# Patient Record
Sex: FEMALE | Race: WHITE | NOT HISPANIC OR LATINO | Employment: OTHER | URBAN - METROPOLITAN AREA
[De-identification: names, ages, dates, MRNs, and addresses within clinical notes are randomized per-mention and may not be internally consistent; named-entity substitution may affect disease eponyms.]

---

## 2017-01-17 ENCOUNTER — HOSPITAL ENCOUNTER (OUTPATIENT)
Dept: CT IMAGING | Facility: HOSPITAL | Age: 72
Discharge: HOME | End: 2017-01-17
Attending: INTERNAL MEDICINE

## 2017-01-17 ENCOUNTER — RECORDS - HEALTHEAST (OUTPATIENT)
Dept: PULMONOLOGY | Facility: OTHER | Age: 72
End: 2017-01-17

## 2017-01-17 ENCOUNTER — OFFICE VISIT - HEALTHEAST (OUTPATIENT)
Dept: PULMONOLOGY | Facility: OTHER | Age: 72
End: 2017-01-17

## 2017-01-17 ENCOUNTER — RECORDS - HEALTHEAST (OUTPATIENT)
Dept: ADMINISTRATIVE | Facility: OTHER | Age: 72
End: 2017-01-17

## 2017-01-17 DIAGNOSIS — J84.116 CRYPTOGENIC ORGANIZING PNEUMONIA (H): ICD-10-CM

## 2017-01-17 DIAGNOSIS — J30.0 VASOMOTOR RHINITIS: ICD-10-CM

## 2017-01-18 ENCOUNTER — OFFICE VISIT - HEALTHEAST (OUTPATIENT)
Dept: INTERNAL MEDICINE | Facility: CLINIC | Age: 72
End: 2017-01-18

## 2017-01-18 DIAGNOSIS — I10 HTN (HYPERTENSION): ICD-10-CM

## 2017-02-21 ENCOUNTER — COMMUNICATION - HEALTHEAST (OUTPATIENT)
Dept: INTERNAL MEDICINE | Facility: CLINIC | Age: 72
End: 2017-02-21

## 2017-02-28 ENCOUNTER — OFFICE VISIT - HEALTHEAST (OUTPATIENT)
Dept: INTERNAL MEDICINE | Facility: CLINIC | Age: 72
End: 2017-02-28

## 2017-02-28 DIAGNOSIS — E78.5 HYPERLIPIDEMIA: ICD-10-CM

## 2017-02-28 DIAGNOSIS — E83.52 HYPERCALCEMIA: ICD-10-CM

## 2017-02-28 DIAGNOSIS — I10 HYPERTENSION: ICD-10-CM

## 2017-02-28 LAB
CHOLEST SERPL-MCNC: 178 MG/DL
FASTING STATUS PATIENT QL REPORTED: YES
HDLC SERPL-MCNC: 44 MG/DL
LDLC SERPL CALC-MCNC: 80 MG/DL
TRIGL SERPL-MCNC: 272 MG/DL

## 2017-03-01 ENCOUNTER — COMMUNICATION - HEALTHEAST (OUTPATIENT)
Dept: INTERNAL MEDICINE | Facility: CLINIC | Age: 72
End: 2017-03-01

## 2017-03-02 ENCOUNTER — AMBULATORY - HEALTHEAST (OUTPATIENT)
Dept: INTERNAL MEDICINE | Facility: CLINIC | Age: 72
End: 2017-03-02

## 2017-03-02 DIAGNOSIS — E83.52 HYPERCALCEMIA: ICD-10-CM

## 2017-03-06 ENCOUNTER — COMMUNICATION - HEALTHEAST (OUTPATIENT)
Dept: INTERNAL MEDICINE | Facility: CLINIC | Age: 72
End: 2017-03-06

## 2017-03-06 ENCOUNTER — AMBULATORY - HEALTHEAST (OUTPATIENT)
Dept: LAB | Facility: CLINIC | Age: 72
End: 2017-03-06

## 2017-03-06 DIAGNOSIS — E83.52 HYPERCALCEMIA: ICD-10-CM

## 2017-03-10 ENCOUNTER — COMMUNICATION - HEALTHEAST (OUTPATIENT)
Dept: INTERNAL MEDICINE | Facility: CLINIC | Age: 72
End: 2017-03-10

## 2017-03-10 DIAGNOSIS — K21.9 GERD (GASTROESOPHAGEAL REFLUX DISEASE): ICD-10-CM

## 2017-04-11 ENCOUNTER — OFFICE VISIT - HEALTHEAST (OUTPATIENT)
Dept: PULMONOLOGY | Facility: OTHER | Age: 72
End: 2017-04-11

## 2017-04-11 DIAGNOSIS — R09.82 POST-NASAL DRIP: ICD-10-CM

## 2017-05-18 ENCOUNTER — COMMUNICATION - HEALTHEAST (OUTPATIENT)
Dept: INTERNAL MEDICINE | Facility: CLINIC | Age: 72
End: 2017-05-18

## 2017-05-18 ENCOUNTER — OFFICE VISIT - HEALTHEAST (OUTPATIENT)
Dept: OTOLARYNGOLOGY | Facility: CLINIC | Age: 72
End: 2017-05-18

## 2017-05-18 DIAGNOSIS — J37.0 CHRONIC LARYNGITIS: ICD-10-CM

## 2017-05-18 DIAGNOSIS — F41.1 ANXIETY STATE: ICD-10-CM

## 2017-05-18 DIAGNOSIS — J30.9 ALLERGIC RHINITIS: ICD-10-CM

## 2017-05-18 DIAGNOSIS — K21.9 LARYNGOPHARYNGEAL REFLUX (LPR): ICD-10-CM

## 2017-05-18 DIAGNOSIS — E78.5 HYPERLIPIDEMIA: ICD-10-CM

## 2017-05-18 ASSESSMENT — MIFFLIN-ST. JEOR: SCORE: 1085.56

## 2017-06-12 ENCOUNTER — COMMUNICATION - HEALTHEAST (OUTPATIENT)
Dept: INTERNAL MEDICINE | Facility: CLINIC | Age: 72
End: 2017-06-12

## 2017-06-12 DIAGNOSIS — E03.9 HYPOTHYROID: ICD-10-CM

## 2017-06-15 ENCOUNTER — COMMUNICATION - HEALTHEAST (OUTPATIENT)
Dept: INTERNAL MEDICINE | Facility: CLINIC | Age: 72
End: 2017-06-15

## 2017-06-15 DIAGNOSIS — K21.9 GERD (GASTROESOPHAGEAL REFLUX DISEASE): ICD-10-CM

## 2017-07-13 ENCOUNTER — OFFICE VISIT - HEALTHEAST (OUTPATIENT)
Dept: INTERNAL MEDICINE | Facility: CLINIC | Age: 72
End: 2017-07-13

## 2017-07-13 DIAGNOSIS — E03.9 HYPOTHYROIDISM: ICD-10-CM

## 2017-07-13 DIAGNOSIS — M81.0 OSTEOPOROSIS: ICD-10-CM

## 2017-07-17 LAB
GLIADIN IGA SER-ACNC: 0.3 U/ML
GLIADIN IGG SER-ACNC: <0.4 U/ML
IGA SERPL-MCNC: 179 MG/DL (ref 65–400)
TTG IGA SER-ACNC: 0.3 U/ML
TTG IGG SER-ACNC: <0.6 U/ML

## 2017-07-19 ENCOUNTER — COMMUNICATION - HEALTHEAST (OUTPATIENT)
Dept: INTERNAL MEDICINE | Facility: CLINIC | Age: 72
End: 2017-07-19

## 2017-07-20 ENCOUNTER — COMMUNICATION - HEALTHEAST (OUTPATIENT)
Dept: INTERNAL MEDICINE | Facility: CLINIC | Age: 72
End: 2017-07-20

## 2017-07-20 DIAGNOSIS — K21.9 GERD (GASTROESOPHAGEAL REFLUX DISEASE): ICD-10-CM

## 2017-09-18 ENCOUNTER — COMMUNICATION - HEALTHEAST (OUTPATIENT)
Dept: INTERNAL MEDICINE | Facility: CLINIC | Age: 72
End: 2017-09-18

## 2017-09-18 DIAGNOSIS — E03.9 HYPOTHYROID: ICD-10-CM

## 2017-10-27 ENCOUNTER — AMBULATORY - HEALTHEAST (OUTPATIENT)
Dept: PULMONOLOGY | Facility: OTHER | Age: 72
End: 2017-10-27

## 2017-10-27 DIAGNOSIS — R06.09 DYSPNEA ON EXERTION: ICD-10-CM

## 2017-10-31 ENCOUNTER — HOSPITAL ENCOUNTER (OUTPATIENT)
Dept: RADIOLOGY | Facility: CLINIC | Age: 72
Discharge: HOME | End: 2017-10-31
Attending: INTERNAL MEDICINE

## 2017-10-31 DIAGNOSIS — R06.09 OTHER FORMS OF DYSPNEA: ICD-10-CM

## 2017-10-31 DIAGNOSIS — R06.09 DYSPNEA ON EXERTION: ICD-10-CM

## 2017-12-04 ENCOUNTER — OFFICE VISIT - HEALTHEAST (OUTPATIENT)
Dept: PULMONOLOGY | Facility: OTHER | Age: 72
End: 2017-12-04

## 2017-12-04 DIAGNOSIS — R05.3 CHRONIC COUGH: ICD-10-CM

## 2017-12-28 ENCOUNTER — HOSPITAL ENCOUNTER (OUTPATIENT)
Dept: MAMMOGRAPHY | Facility: CLINIC | Age: 72
Discharge: HOME | End: 2017-12-28
Attending: INTERNAL MEDICINE

## 2017-12-28 DIAGNOSIS — Z12.31 VISIT FOR SCREENING MAMMOGRAM: ICD-10-CM

## 2018-03-08 ENCOUNTER — OFFICE VISIT - HEALTHEAST (OUTPATIENT)
Dept: INTERNAL MEDICINE | Facility: CLINIC | Age: 73
End: 2018-03-08

## 2018-03-08 ENCOUNTER — COMMUNICATION - HEALTHEAST (OUTPATIENT)
Dept: INTERNAL MEDICINE | Facility: CLINIC | Age: 73
End: 2018-03-08

## 2018-03-08 ENCOUNTER — RECORDS - HEALTHEAST (OUTPATIENT)
Dept: GENERAL RADIOLOGY | Facility: CLINIC | Age: 73
End: 2018-03-08

## 2018-03-08 DIAGNOSIS — R10.32 LEFT LOWER QUADRANT PAIN: ICD-10-CM

## 2018-03-08 DIAGNOSIS — E03.9 HYPOTHYROIDISM: ICD-10-CM

## 2018-03-08 DIAGNOSIS — E78.2 MIXED HYPERLIPIDEMIA: ICD-10-CM

## 2018-03-08 DIAGNOSIS — I10 ESSENTIAL HYPERTENSION: ICD-10-CM

## 2018-03-08 DIAGNOSIS — R10.32 ABDOMINAL PAIN, CHRONIC, LEFT LOWER QUADRANT: ICD-10-CM

## 2018-03-08 DIAGNOSIS — G89.29 OTHER CHRONIC PAIN: ICD-10-CM

## 2018-03-08 DIAGNOSIS — E55.9 VITAMIN D DEFICIENCY: ICD-10-CM

## 2018-03-08 DIAGNOSIS — G89.29 ABDOMINAL PAIN, CHRONIC, LEFT LOWER QUADRANT: ICD-10-CM

## 2018-03-08 LAB
ALBUMIN SERPL-MCNC: 3.9 G/DL (ref 3.5–5)
ALP SERPL-CCNC: 83 U/L (ref 45–120)
ALT SERPL W P-5'-P-CCNC: 27 U/L (ref 0–45)
ANION GAP SERPL CALCULATED.3IONS-SCNC: 10 MMOL/L (ref 5–18)
AST SERPL W P-5'-P-CCNC: 24 U/L (ref 0–40)
BILIRUB SERPL-MCNC: 0.4 MG/DL (ref 0–1)
BUN SERPL-MCNC: 10 MG/DL (ref 8–28)
CALCIUM SERPL-MCNC: 10.1 MG/DL (ref 8.5–10.5)
CHLORIDE BLD-SCNC: 106 MMOL/L (ref 98–107)
CHOLEST SERPL-MCNC: 270 MG/DL
CO2 SERPL-SCNC: 26 MMOL/L (ref 22–31)
CREAT SERPL-MCNC: 0.79 MG/DL (ref 0.6–1.1)
FASTING STATUS PATIENT QL REPORTED: NO
GFR SERPL CREATININE-BSD FRML MDRD: >60 ML/MIN/1.73M2
GLUCOSE BLD-MCNC: 90 MG/DL (ref 70–125)
HDLC SERPL-MCNC: 45 MG/DL
LDLC SERPL CALC-MCNC: 146 MG/DL
POTASSIUM BLD-SCNC: 5 MMOL/L (ref 3.5–5)
PROT SERPL-MCNC: 7.7 G/DL (ref 6–8)
SODIUM SERPL-SCNC: 142 MMOL/L (ref 136–145)
TRIGL SERPL-MCNC: 393 MG/DL
TSH SERPL DL<=0.005 MIU/L-ACNC: 1.84 UIU/ML (ref 0.3–5)

## 2018-03-09 LAB — 25(OH)D3 SERPL-MCNC: 60.7 NG/ML (ref 30–80)

## 2018-03-15 ENCOUNTER — HOSPITAL ENCOUNTER (OUTPATIENT)
Dept: ULTRASOUND IMAGING | Facility: CLINIC | Age: 73
Discharge: HOME | End: 2018-03-15
Attending: INTERNAL MEDICINE

## 2018-03-15 ENCOUNTER — COMMUNICATION - HEALTHEAST (OUTPATIENT)
Dept: INTERNAL MEDICINE | Facility: CLINIC | Age: 73
End: 2018-03-15

## 2018-03-15 DIAGNOSIS — G89.29 ABDOMINAL PAIN, CHRONIC, LEFT LOWER QUADRANT: ICD-10-CM

## 2018-03-15 DIAGNOSIS — R10.32 ABDOMINAL PAIN, CHRONIC, LEFT LOWER QUADRANT: ICD-10-CM

## 2018-03-25 ENCOUNTER — COMMUNICATION - HEALTHEAST (OUTPATIENT)
Dept: INTERNAL MEDICINE | Facility: CLINIC | Age: 73
End: 2018-03-25

## 2018-04-11 ENCOUNTER — OFFICE VISIT - HEALTHEAST (OUTPATIENT)
Dept: INTERNAL MEDICINE | Facility: CLINIC | Age: 73
End: 2018-04-11

## 2018-04-11 DIAGNOSIS — J02.9 SORE THROAT: ICD-10-CM

## 2018-04-11 DIAGNOSIS — J30.0 VASOMOTOR RHINITIS: ICD-10-CM

## 2018-04-11 DIAGNOSIS — I10 ESSENTIAL HYPERTENSION: ICD-10-CM

## 2018-04-11 DIAGNOSIS — R10.32 LLQ ABDOMINAL PAIN: ICD-10-CM

## 2018-04-11 LAB
ANION GAP SERPL CALCULATED.3IONS-SCNC: 15 MMOL/L (ref 5–18)
BUN SERPL-MCNC: 15 MG/DL (ref 8–28)
CALCIUM SERPL-MCNC: 11.6 MG/DL (ref 8.5–10.5)
CHLORIDE BLD-SCNC: 100 MMOL/L (ref 98–107)
CO2 SERPL-SCNC: 29 MMOL/L (ref 22–31)
CREAT SERPL-MCNC: 0.73 MG/DL (ref 0.6–1.1)
GFR SERPL CREATININE-BSD FRML MDRD: >60 ML/MIN/1.73M2
GLUCOSE BLD-MCNC: 85 MG/DL (ref 70–125)
POTASSIUM BLD-SCNC: 3.6 MMOL/L (ref 3.5–5)
SODIUM SERPL-SCNC: 144 MMOL/L (ref 136–145)

## 2018-04-12 ENCOUNTER — COMMUNICATION - HEALTHEAST (OUTPATIENT)
Dept: INTERNAL MEDICINE | Facility: CLINIC | Age: 73
End: 2018-04-12

## 2018-04-25 ENCOUNTER — OFFICE VISIT - HEALTHEAST (OUTPATIENT)
Dept: INTERNAL MEDICINE | Facility: CLINIC | Age: 73
End: 2018-04-25

## 2018-04-25 DIAGNOSIS — I10 ESSENTIAL HYPERTENSION: ICD-10-CM

## 2018-04-25 LAB
ANION GAP SERPL CALCULATED.3IONS-SCNC: 10 MMOL/L (ref 5–18)
BUN SERPL-MCNC: 17 MG/DL (ref 8–28)
CALCIUM SERPL-MCNC: 10.5 MG/DL (ref 8.5–10.5)
CHLORIDE BLD-SCNC: 98 MMOL/L (ref 98–107)
CO2 SERPL-SCNC: 33 MMOL/L (ref 22–31)
CREAT SERPL-MCNC: 0.8 MG/DL (ref 0.6–1.1)
GFR SERPL CREATININE-BSD FRML MDRD: >60 ML/MIN/1.73M2
GLUCOSE BLD-MCNC: 88 MG/DL (ref 70–125)
POTASSIUM BLD-SCNC: 3.1 MMOL/L (ref 3.5–5)
SODIUM SERPL-SCNC: 141 MMOL/L (ref 136–145)

## 2018-05-30 ENCOUNTER — OFFICE VISIT - HEALTHEAST (OUTPATIENT)
Dept: INTERNAL MEDICINE | Facility: CLINIC | Age: 73
End: 2018-05-30

## 2018-05-30 DIAGNOSIS — E78.00 HYPERCHOLESTEREMIA: ICD-10-CM

## 2018-05-30 DIAGNOSIS — I10 HYPERTENSION: ICD-10-CM

## 2018-05-30 LAB
ANION GAP SERPL CALCULATED.3IONS-SCNC: 9 MMOL/L (ref 5–18)
BUN SERPL-MCNC: 17 MG/DL (ref 8–28)
CALCIUM SERPL-MCNC: 11 MG/DL (ref 8.5–10.5)
CHLORIDE BLD-SCNC: 100 MMOL/L (ref 98–107)
CHOLEST SERPL-MCNC: 168 MG/DL
CO2 SERPL-SCNC: 35 MMOL/L (ref 22–31)
CREAT SERPL-MCNC: 0.76 MG/DL (ref 0.6–1.1)
FASTING STATUS PATIENT QL REPORTED: YES
GFR SERPL CREATININE-BSD FRML MDRD: >60 ML/MIN/1.73M2
GLUCOSE BLD-MCNC: 92 MG/DL (ref 70–125)
HDLC SERPL-MCNC: 44 MG/DL
LDLC SERPL CALC-MCNC: 71 MG/DL
POTASSIUM BLD-SCNC: 3.3 MMOL/L (ref 3.5–5)
SODIUM SERPL-SCNC: 144 MMOL/L (ref 136–145)
TRIGL SERPL-MCNC: 265 MG/DL

## 2018-06-19 ENCOUNTER — COMMUNICATION - HEALTHEAST (OUTPATIENT)
Dept: INTERNAL MEDICINE | Facility: CLINIC | Age: 73
End: 2018-06-19

## 2018-06-19 DIAGNOSIS — E03.9 HYPOTHYROID: ICD-10-CM

## 2018-08-07 ENCOUNTER — COMMUNICATION - HEALTHEAST (OUTPATIENT)
Dept: INTERNAL MEDICINE | Facility: CLINIC | Age: 73
End: 2018-08-07

## 2018-08-07 DIAGNOSIS — E78.2 MIXED HYPERLIPIDEMIA: ICD-10-CM

## 2018-09-17 ENCOUNTER — COMMUNICATION - HEALTHEAST (OUTPATIENT)
Dept: INTERNAL MEDICINE | Facility: CLINIC | Age: 73
End: 2018-09-17

## 2018-09-17 DIAGNOSIS — I10 ESSENTIAL HYPERTENSION: ICD-10-CM

## 2018-10-02 ENCOUNTER — OFFICE VISIT - HEALTHEAST (OUTPATIENT)
Dept: INTERNAL MEDICINE | Facility: CLINIC | Age: 73
End: 2018-10-02

## 2018-10-02 DIAGNOSIS — R53.83 FATIGUE: ICD-10-CM

## 2018-10-02 DIAGNOSIS — I10 ESSENTIAL HYPERTENSION: ICD-10-CM

## 2018-10-02 LAB
ALBUMIN SERPL-MCNC: 4.1 G/DL (ref 3.5–5)
ALP SERPL-CCNC: 46 U/L (ref 45–120)
ALT SERPL W P-5'-P-CCNC: 22 U/L (ref 0–45)
ANION GAP SERPL CALCULATED.3IONS-SCNC: 13 MMOL/L (ref 5–18)
AST SERPL W P-5'-P-CCNC: 21 U/L (ref 0–40)
BILIRUB SERPL-MCNC: 0.7 MG/DL (ref 0–1)
BUN SERPL-MCNC: 19 MG/DL (ref 8–28)
CALCIUM SERPL-MCNC: 10.7 MG/DL (ref 8.5–10.5)
CHLORIDE BLD-SCNC: 101 MMOL/L (ref 98–107)
CO2 SERPL-SCNC: 30 MMOL/L (ref 22–31)
CREAT SERPL-MCNC: 0.77 MG/DL (ref 0.6–1.1)
ERYTHROCYTE [DISTWIDTH] IN BLOOD BY AUTOMATED COUNT: 12.6 % (ref 11–14.5)
GFR SERPL CREATININE-BSD FRML MDRD: >60 ML/MIN/1.73M2
GLUCOSE BLD-MCNC: 94 MG/DL (ref 70–125)
HCT VFR BLD AUTO: 45 % (ref 35–47)
HGB BLD-MCNC: 14.9 G/DL (ref 12–16)
MCH RBC QN AUTO: 29.8 PG (ref 27–34)
MCHC RBC AUTO-ENTMCNC: 33.1 G/DL (ref 32–36)
MCV RBC AUTO: 90 FL (ref 80–100)
PLATELET # BLD AUTO: 199 THOU/UL (ref 140–440)
PMV BLD AUTO: 8.2 FL (ref 7–10)
POTASSIUM BLD-SCNC: 3.3 MMOL/L (ref 3.5–5)
PROT SERPL-MCNC: 7.7 G/DL (ref 6–8)
RBC # BLD AUTO: 4.99 MILL/UL (ref 3.8–5.4)
SODIUM SERPL-SCNC: 144 MMOL/L (ref 136–145)
TSH SERPL DL<=0.005 MIU/L-ACNC: 1.24 UIU/ML (ref 0.3–5)
VIT B12 SERPL-MCNC: 455 PG/ML (ref 213–816)
WBC: 9 THOU/UL (ref 4–11)

## 2018-10-03 LAB — 25(OH)D3 SERPL-MCNC: 57.4 NG/ML (ref 30–80)

## 2018-10-24 ENCOUNTER — OFFICE VISIT - HEALTHEAST (OUTPATIENT)
Dept: INTERNAL MEDICINE | Facility: CLINIC | Age: 73
End: 2018-10-24

## 2018-10-24 DIAGNOSIS — I10 ESSENTIAL HYPERTENSION: ICD-10-CM

## 2018-10-24 LAB
ANION GAP SERPL CALCULATED.3IONS-SCNC: 9 MMOL/L (ref 5–18)
BUN SERPL-MCNC: 16 MG/DL (ref 8–28)
CALCIUM SERPL-MCNC: 10.5 MG/DL (ref 8.5–10.5)
CHLORIDE BLD-SCNC: 101 MMOL/L (ref 98–107)
CO2 SERPL-SCNC: 31 MMOL/L (ref 22–31)
CREAT SERPL-MCNC: 0.81 MG/DL (ref 0.6–1.1)
GFR SERPL CREATININE-BSD FRML MDRD: >60 ML/MIN/1.73M2
GLUCOSE BLD-MCNC: 87 MG/DL (ref 70–125)
POTASSIUM BLD-SCNC: 4 MMOL/L (ref 3.5–5)
SODIUM SERPL-SCNC: 141 MMOL/L (ref 136–145)

## 2018-10-25 ENCOUNTER — COMMUNICATION - HEALTHEAST (OUTPATIENT)
Dept: INTERNAL MEDICINE | Facility: CLINIC | Age: 73
End: 2018-10-25

## 2018-11-13 ENCOUNTER — OFFICE VISIT - HEALTHEAST (OUTPATIENT)
Dept: INTERNAL MEDICINE | Facility: CLINIC | Age: 73
End: 2018-11-13

## 2018-11-13 ENCOUNTER — AMBULATORY - HEALTHEAST (OUTPATIENT)
Dept: INTERNAL MEDICINE | Facility: CLINIC | Age: 73
End: 2018-11-13

## 2018-11-13 DIAGNOSIS — E78.2 MIXED HYPERLIPIDEMIA: ICD-10-CM

## 2018-11-13 DIAGNOSIS — Z23 NEED FOR IMMUNIZATION AGAINST INFLUENZA: ICD-10-CM

## 2018-11-13 DIAGNOSIS — E55.9 VITAMIN D DEFICIENCY: ICD-10-CM

## 2018-11-13 DIAGNOSIS — F41.1 ANXIETY STATE: ICD-10-CM

## 2018-11-13 DIAGNOSIS — I10 ESSENTIAL HYPERTENSION: ICD-10-CM

## 2018-11-13 DIAGNOSIS — E03.9 HYPOTHYROIDISM, UNSPECIFIED TYPE: ICD-10-CM

## 2018-11-13 DIAGNOSIS — Z00.00 ROUTINE GENERAL MEDICAL EXAMINATION AT A HEALTH CARE FACILITY: ICD-10-CM

## 2018-11-13 DIAGNOSIS — E83.52 HYPERCALCEMIA: ICD-10-CM

## 2018-11-13 DIAGNOSIS — M81.0 AGE RELATED OSTEOPOROSIS, UNSPECIFIED PATHOLOGICAL FRACTURE PRESENCE: ICD-10-CM

## 2018-11-13 DIAGNOSIS — K21.9 GASTROESOPHAGEAL REFLUX DISEASE WITHOUT ESOPHAGITIS: ICD-10-CM

## 2018-11-13 LAB
ALBUMIN SERPL-MCNC: 4.3 G/DL (ref 3.5–5)
ALP SERPL-CCNC: 58 U/L (ref 45–120)
ALT SERPL W P-5'-P-CCNC: 28 U/L (ref 0–45)
ANION GAP SERPL CALCULATED.3IONS-SCNC: 13 MMOL/L (ref 5–18)
AST SERPL W P-5'-P-CCNC: 28 U/L (ref 0–40)
BILIRUB SERPL-MCNC: 0.7 MG/DL (ref 0–1)
BUN SERPL-MCNC: 16 MG/DL (ref 8–28)
CALCIUM SERPL-MCNC: 11 MG/DL (ref 8.5–10.5)
CHLORIDE BLD-SCNC: 100 MMOL/L (ref 98–107)
CHOLEST SERPL-MCNC: 169 MG/DL
CO2 SERPL-SCNC: 29 MMOL/L (ref 22–31)
CREAT SERPL-MCNC: 0.85 MG/DL (ref 0.6–1.1)
ERYTHROCYTE [DISTWIDTH] IN BLOOD BY AUTOMATED COUNT: 12.2 % (ref 11–14.5)
FASTING STATUS PATIENT QL REPORTED: YES
GFR SERPL CREATININE-BSD FRML MDRD: >60 ML/MIN/1.73M2
GLUCOSE BLD-MCNC: 91 MG/DL (ref 70–125)
HCT VFR BLD AUTO: 46.6 % (ref 35–47)
HDLC SERPL-MCNC: 49 MG/DL
HGB BLD-MCNC: 15.1 G/DL (ref 12–16)
LDLC SERPL CALC-MCNC: 66 MG/DL
MCH RBC QN AUTO: 30 PG (ref 27–34)
MCHC RBC AUTO-ENTMCNC: 32.4 G/DL (ref 32–36)
MCV RBC AUTO: 93 FL (ref 80–100)
PLATELET # BLD AUTO: 220 THOU/UL (ref 140–440)
PMV BLD AUTO: 11.2 FL (ref 8.5–12.5)
POTASSIUM BLD-SCNC: 3.8 MMOL/L (ref 3.5–5)
PROT SERPL-MCNC: 8.1 G/DL (ref 6–8)
RBC # BLD AUTO: 5.04 MILL/UL (ref 3.8–5.4)
SODIUM SERPL-SCNC: 142 MMOL/L (ref 136–145)
TRIGL SERPL-MCNC: 268 MG/DL
WBC: 10.5 THOU/UL (ref 4–11)

## 2018-11-13 ASSESSMENT — MIFFLIN-ST. JEOR: SCORE: 1093.7

## 2018-11-14 ENCOUNTER — COMMUNICATION - HEALTHEAST (OUTPATIENT)
Dept: INTERNAL MEDICINE | Facility: CLINIC | Age: 73
End: 2018-11-14

## 2018-11-14 LAB — 25(OH)D3 SERPL-MCNC: 60.8 NG/ML (ref 30–80)

## 2018-11-30 ENCOUNTER — AMBULATORY - HEALTHEAST (OUTPATIENT)
Dept: LAB | Facility: CLINIC | Age: 73
End: 2018-11-30

## 2018-11-30 DIAGNOSIS — E83.52 HYPERCALCEMIA: ICD-10-CM

## 2018-11-30 LAB
ANION GAP SERPL CALCULATED.3IONS-SCNC: 12 MMOL/L (ref 5–18)
BUN SERPL-MCNC: 14 MG/DL (ref 8–28)
CALCIUM SERPL-MCNC: 10.2 MG/DL (ref 8.5–10.5)
CHLORIDE BLD-SCNC: 103 MMOL/L (ref 98–107)
CO2 SERPL-SCNC: 28 MMOL/L (ref 22–31)
CREAT SERPL-MCNC: 0.77 MG/DL (ref 0.6–1.1)
GFR SERPL CREATININE-BSD FRML MDRD: >60 ML/MIN/1.73M2
GLUCOSE BLD-MCNC: 92 MG/DL (ref 70–125)
POTASSIUM BLD-SCNC: 3.5 MMOL/L (ref 3.5–5)
PTH-INTACT SERPL-MCNC: 42 PG/ML (ref 10–86)
SODIUM SERPL-SCNC: 143 MMOL/L (ref 136–145)

## 2018-12-11 ENCOUNTER — COMMUNICATION - HEALTHEAST (OUTPATIENT)
Dept: INTERNAL MEDICINE | Facility: CLINIC | Age: 73
End: 2018-12-11

## 2018-12-18 ENCOUNTER — OFFICE VISIT - HEALTHEAST (OUTPATIENT)
Dept: PULMONOLOGY | Facility: OTHER | Age: 73
End: 2018-12-18

## 2018-12-18 DIAGNOSIS — J84.89 ORGANIZING PNEUMONIA (H): ICD-10-CM

## 2018-12-18 ASSESSMENT — MIFFLIN-ST. JEOR: SCORE: 1112.78

## 2019-01-02 ENCOUNTER — HOSPITAL ENCOUNTER (OUTPATIENT)
Dept: MAMMOGRAPHY | Facility: CLINIC | Age: 74
Discharge: HOME | End: 2019-01-02
Attending: INTERNAL MEDICINE

## 2019-01-02 DIAGNOSIS — Z12.31 VISIT FOR SCREENING MAMMOGRAM: ICD-10-CM

## 2019-03-06 ENCOUNTER — COMMUNICATION - HEALTHEAST (OUTPATIENT)
Dept: INTERNAL MEDICINE | Facility: CLINIC | Age: 74
End: 2019-03-06

## 2019-03-11 ENCOUNTER — OFFICE VISIT - HEALTHEAST (OUTPATIENT)
Dept: FAMILY MEDICINE | Facility: CLINIC | Age: 74
End: 2019-03-11

## 2019-03-11 DIAGNOSIS — M79.622 PAIN OF LEFT UPPER ARM: ICD-10-CM

## 2019-03-13 ENCOUNTER — COMMUNICATION - HEALTHEAST (OUTPATIENT)
Dept: INTERNAL MEDICINE | Facility: CLINIC | Age: 74
End: 2019-03-13

## 2019-03-13 DIAGNOSIS — E03.9 HYPOTHYROID: ICD-10-CM

## 2019-03-19 ENCOUNTER — OFFICE VISIT - HEALTHEAST (OUTPATIENT)
Dept: FAMILY MEDICINE | Facility: CLINIC | Age: 74
End: 2019-03-19

## 2019-03-19 DIAGNOSIS — E03.9 HYPOTHYROIDISM, UNSPECIFIED TYPE: ICD-10-CM

## 2019-03-19 DIAGNOSIS — I10 ESSENTIAL HYPERTENSION: ICD-10-CM

## 2019-03-19 DIAGNOSIS — Z76.89 ENCOUNTER TO ESTABLISH CARE: ICD-10-CM

## 2019-03-19 DIAGNOSIS — E78.2 MIXED HYPERLIPIDEMIA: ICD-10-CM

## 2019-03-19 DIAGNOSIS — M81.0 AGE RELATED OSTEOPOROSIS, UNSPECIFIED PATHOLOGICAL FRACTURE PRESENCE: ICD-10-CM

## 2019-04-27 ENCOUNTER — COMMUNICATION - HEALTHEAST (OUTPATIENT)
Dept: INTERNAL MEDICINE | Facility: CLINIC | Age: 74
End: 2019-04-27

## 2019-04-27 DIAGNOSIS — I10 ESSENTIAL HYPERTENSION: ICD-10-CM

## 2019-05-14 ENCOUNTER — COMMUNICATION - HEALTHEAST (OUTPATIENT)
Dept: INTERNAL MEDICINE | Facility: CLINIC | Age: 74
End: 2019-05-14

## 2019-05-14 DIAGNOSIS — E78.2 MIXED HYPERLIPIDEMIA: ICD-10-CM

## 2019-05-21 ENCOUNTER — OFFICE VISIT - HEALTHEAST (OUTPATIENT)
Dept: FAMILY MEDICINE | Facility: CLINIC | Age: 74
End: 2019-05-21

## 2019-05-21 DIAGNOSIS — R07.0 THROAT PAIN: ICD-10-CM

## 2019-05-21 DIAGNOSIS — B34.9 VIRAL SYNDROME: ICD-10-CM

## 2019-05-21 LAB — DEPRECATED S PYO AG THROAT QL EIA: NORMAL

## 2019-05-22 LAB — GROUP A STREP BY PCR: NORMAL

## 2019-10-12 ENCOUNTER — COMMUNICATION - HEALTHEAST (OUTPATIENT)
Dept: INTERNAL MEDICINE | Facility: CLINIC | Age: 74
End: 2019-10-12

## 2019-10-12 DIAGNOSIS — K21.9 GERD (GASTROESOPHAGEAL REFLUX DISEASE): ICD-10-CM

## 2019-10-25 ENCOUNTER — COMMUNICATION - HEALTHEAST (OUTPATIENT)
Dept: INTERNAL MEDICINE | Facility: CLINIC | Age: 74
End: 2019-10-25

## 2019-10-25 DIAGNOSIS — I10 ESSENTIAL HYPERTENSION: ICD-10-CM

## 2019-11-16 ENCOUNTER — COMMUNICATION - HEALTHEAST (OUTPATIENT)
Dept: INTERNAL MEDICINE | Facility: CLINIC | Age: 74
End: 2019-11-16

## 2019-11-16 DIAGNOSIS — R11.0 NAUSEA: ICD-10-CM

## 2019-12-30 ENCOUNTER — COMMUNICATION - HEALTHEAST (OUTPATIENT)
Dept: INTERNAL MEDICINE | Facility: CLINIC | Age: 74
End: 2019-12-30

## 2019-12-30 DIAGNOSIS — E03.9 HYPOTHYROID: ICD-10-CM

## 2020-01-20 ENCOUNTER — OFFICE VISIT - HEALTHEAST (OUTPATIENT)
Dept: FAMILY MEDICINE | Facility: CLINIC | Age: 75
End: 2020-01-20

## 2020-01-20 DIAGNOSIS — E03.9 HYPOTHYROIDISM, UNSPECIFIED TYPE: ICD-10-CM

## 2020-01-20 DIAGNOSIS — E78.2 MIXED HYPERLIPIDEMIA: ICD-10-CM

## 2020-01-20 DIAGNOSIS — E03.9 HYPOTHYROID: ICD-10-CM

## 2020-01-20 DIAGNOSIS — E83.52 SERUM CALCIUM ELEVATED: ICD-10-CM

## 2020-01-20 DIAGNOSIS — Z00.00 ROUTINE GENERAL MEDICAL EXAMINATION AT A HEALTH CARE FACILITY: ICD-10-CM

## 2020-01-20 DIAGNOSIS — I10 ESSENTIAL HYPERTENSION: ICD-10-CM

## 2020-01-20 LAB
ANION GAP SERPL CALCULATED.3IONS-SCNC: 11 MMOL/L (ref 5–18)
BUN SERPL-MCNC: 14 MG/DL (ref 8–28)
CALCIUM SERPL-MCNC: 10.8 MG/DL (ref 8.5–10.5)
CHLORIDE BLD-SCNC: 101 MMOL/L (ref 98–107)
CHOLEST SERPL-MCNC: 185 MG/DL
CO2 SERPL-SCNC: 30 MMOL/L (ref 22–31)
CREAT SERPL-MCNC: 0.81 MG/DL (ref 0.6–1.1)
FASTING STATUS PATIENT QL REPORTED: YES
GFR SERPL CREATININE-BSD FRML MDRD: >60 ML/MIN/1.73M2
GLUCOSE BLD-MCNC: 88 MG/DL (ref 70–125)
HDLC SERPL-MCNC: 47 MG/DL
LDLC SERPL CALC-MCNC: 86 MG/DL
POTASSIUM BLD-SCNC: 3.6 MMOL/L (ref 3.5–5)
SODIUM SERPL-SCNC: 142 MMOL/L (ref 136–145)
TRIGL SERPL-MCNC: 259 MG/DL
TSH SERPL DL<=0.005 MIU/L-ACNC: 1.34 UIU/ML (ref 0.3–5)

## 2020-01-20 ASSESSMENT — MIFFLIN-ST. JEOR: SCORE: 1106.88

## 2020-01-21 ENCOUNTER — COMMUNICATION - HEALTHEAST (OUTPATIENT)
Dept: FAMILY MEDICINE | Facility: CLINIC | Age: 75
End: 2020-01-21

## 2020-01-21 LAB
HCV AB SERPL QL IA: NEGATIVE
PTH-INTACT SERPL-MCNC: 26 PG/ML (ref 10–86)

## 2020-02-07 ENCOUNTER — COMMUNICATION - HEALTHEAST (OUTPATIENT)
Dept: TELEHEALTH | Facility: CLINIC | Age: 75
End: 2020-02-07

## 2020-02-07 ENCOUNTER — HOSPITAL ENCOUNTER (OUTPATIENT)
Dept: MAMMOGRAPHY | Facility: CLINIC | Age: 75
Discharge: HOME | End: 2020-02-07

## 2020-02-07 DIAGNOSIS — Z12.31 VISIT FOR SCREENING MAMMOGRAM: ICD-10-CM

## 2020-03-13 ENCOUNTER — COMMUNICATION - HEALTHEAST (OUTPATIENT)
Dept: SCHEDULING | Facility: CLINIC | Age: 75
End: 2020-03-13

## 2020-03-13 DIAGNOSIS — K21.9 GERD (GASTROESOPHAGEAL REFLUX DISEASE): ICD-10-CM

## 2020-04-21 ENCOUNTER — COMMUNICATION - HEALTHEAST (OUTPATIENT)
Dept: FAMILY MEDICINE | Facility: CLINIC | Age: 75
End: 2020-04-21

## 2020-04-21 ENCOUNTER — COMMUNICATION - HEALTHEAST (OUTPATIENT)
Dept: INTERNAL MEDICINE | Facility: CLINIC | Age: 75
End: 2020-04-21

## 2020-04-21 DIAGNOSIS — K21.9 GERD (GASTROESOPHAGEAL REFLUX DISEASE): ICD-10-CM

## 2020-08-20 ENCOUNTER — RECORDS - HEALTHEAST (OUTPATIENT)
Dept: GENERAL RADIOLOGY | Facility: CLINIC | Age: 75
End: 2020-08-20

## 2020-08-20 ENCOUNTER — OFFICE VISIT - HEALTHEAST (OUTPATIENT)
Dept: INTERNAL MEDICINE | Facility: CLINIC | Age: 75
End: 2020-08-20

## 2020-08-20 DIAGNOSIS — R11.0 NAUSEA: ICD-10-CM

## 2020-08-20 DIAGNOSIS — M81.0 AGE RELATED OSTEOPOROSIS, UNSPECIFIED PATHOLOGICAL FRACTURE PRESENCE: ICD-10-CM

## 2020-08-20 DIAGNOSIS — I10 ESSENTIAL HYPERTENSION: ICD-10-CM

## 2020-08-20 DIAGNOSIS — E78.2 MIXED HYPERLIPIDEMIA: ICD-10-CM

## 2020-08-20 DIAGNOSIS — Z76.89 ENCOUNTER TO ESTABLISH CARE: ICD-10-CM

## 2020-08-20 DIAGNOSIS — Z92.29 PERSONAL HISTORY OF OTHER DRUG THERAPY: ICD-10-CM

## 2020-08-20 DIAGNOSIS — E03.9 HYPOTHYROIDISM, UNSPECIFIED TYPE: ICD-10-CM

## 2020-08-20 DIAGNOSIS — S69.92XA INJURY OF LEFT WRIST, INITIAL ENCOUNTER: ICD-10-CM

## 2020-08-20 DIAGNOSIS — S69.92XA UNSPECIFIED INJURY OF LEFT WRIST, HAND AND FINGER(S), INITIAL ENCOUNTER: ICD-10-CM

## 2020-08-20 LAB
ALBUMIN SERPL-MCNC: 4.2 G/DL (ref 3.5–5)
ALBUMIN UR-MCNC: NEGATIVE MG/DL
ALP SERPL-CCNC: 58 U/L (ref 45–120)
ALT SERPL W P-5'-P-CCNC: 27 U/L (ref 0–45)
ANION GAP SERPL CALCULATED.3IONS-SCNC: 12 MMOL/L (ref 5–18)
APPEARANCE UR: CLEAR
AST SERPL W P-5'-P-CCNC: 23 U/L (ref 0–40)
BILIRUB SERPL-MCNC: 0.6 MG/DL (ref 0–1)
BILIRUB UR QL STRIP: NEGATIVE
BUN SERPL-MCNC: 13 MG/DL (ref 8–28)
CALCIUM SERPL-MCNC: 10.4 MG/DL (ref 8.5–10.5)
CHLORIDE BLD-SCNC: 104 MMOL/L (ref 98–107)
CHOLEST SERPL-MCNC: 165 MG/DL
CO2 SERPL-SCNC: 27 MMOL/L (ref 22–31)
COLOR UR AUTO: YELLOW
CREAT SERPL-MCNC: 0.89 MG/DL (ref 0.6–1.1)
ERYTHROCYTE [DISTWIDTH] IN BLOOD BY AUTOMATED COUNT: 12.3 % (ref 11–14.5)
FASTING STATUS PATIENT QL REPORTED: YES
GFR SERPL CREATININE-BSD FRML MDRD: >60 ML/MIN/1.73M2
GLUCOSE BLD-MCNC: 98 MG/DL (ref 70–125)
GLUCOSE UR STRIP-MCNC: NEGATIVE MG/DL
HCT VFR BLD AUTO: 45.6 % (ref 35–47)
HDLC SERPL-MCNC: 46 MG/DL
HGB BLD-MCNC: 15.3 G/DL (ref 12–16)
HGB UR QL STRIP: NEGATIVE
KETONES UR STRIP-MCNC: NEGATIVE MG/DL
LDLC SERPL CALC-MCNC: 72 MG/DL
LEUKOCYTE ESTERASE UR QL STRIP: NEGATIVE
MCH RBC QN AUTO: 30.2 PG (ref 27–34)
MCHC RBC AUTO-ENTMCNC: 33.5 G/DL (ref 32–36)
MCV RBC AUTO: 90 FL (ref 80–100)
NITRATE UR QL: NEGATIVE
PH UR STRIP: 7 [PH] (ref 5–8)
PLATELET # BLD AUTO: 192 THOU/UL (ref 140–440)
PMV BLD AUTO: 8.3 FL (ref 7–10)
POTASSIUM BLD-SCNC: 3.7 MMOL/L (ref 3.5–5)
PROT SERPL-MCNC: 7.8 G/DL (ref 6–8)
RBC # BLD AUTO: 5.06 MILL/UL (ref 3.8–5.4)
SODIUM SERPL-SCNC: 143 MMOL/L (ref 136–145)
SP GR UR STRIP: 1.02 (ref 1–1.03)
TRIGL SERPL-MCNC: 237 MG/DL
TSH SERPL DL<=0.005 MIU/L-ACNC: 2.6 UIU/ML (ref 0.3–5)
UROBILINOGEN UR STRIP-ACNC: NORMAL
WBC: 7.3 THOU/UL (ref 4–11)

## 2020-08-21 LAB — 25(OH)D3 SERPL-MCNC: 80.4 NG/ML (ref 30–80)

## 2020-08-26 ENCOUNTER — COMMUNICATION - HEALTHEAST (OUTPATIENT)
Dept: INTERNAL MEDICINE | Facility: CLINIC | Age: 75
End: 2020-08-26

## 2020-09-10 ENCOUNTER — OFFICE VISIT - HEALTHEAST (OUTPATIENT)
Dept: INTERNAL MEDICINE | Facility: CLINIC | Age: 75
End: 2020-09-10

## 2020-09-10 ENCOUNTER — RECORDS - HEALTHEAST (OUTPATIENT)
Dept: BONE DENSITY | Facility: CLINIC | Age: 75
End: 2020-09-10

## 2020-09-10 ENCOUNTER — RECORDS - HEALTHEAST (OUTPATIENT)
Dept: ADMINISTRATIVE | Facility: OTHER | Age: 75
End: 2020-09-10

## 2020-09-10 DIAGNOSIS — R11.0 NAUSEA: ICD-10-CM

## 2020-09-10 DIAGNOSIS — E78.2 MIXED HYPERLIPIDEMIA: ICD-10-CM

## 2020-09-10 DIAGNOSIS — M81.0 AGE-RELATED OSTEOPOROSIS WITHOUT CURRENT PATHOLOGICAL FRACTURE: ICD-10-CM

## 2020-09-10 DIAGNOSIS — K21.9 GERD (GASTROESOPHAGEAL REFLUX DISEASE): ICD-10-CM

## 2020-09-10 DIAGNOSIS — M81.0 AGE RELATED OSTEOPOROSIS, UNSPECIFIED PATHOLOGICAL FRACTURE PRESENCE: ICD-10-CM

## 2020-09-10 DIAGNOSIS — E03.9 HYPOTHYROIDISM, UNSPECIFIED TYPE: ICD-10-CM

## 2020-09-10 DIAGNOSIS — Z00.00 ROUTINE GENERAL MEDICAL EXAMINATION AT A HEALTH CARE FACILITY: ICD-10-CM

## 2020-09-10 DIAGNOSIS — Z23 NEED FOR INFLUENZA VACCINATION: ICD-10-CM

## 2020-09-10 DIAGNOSIS — J30.0 VASOMOTOR RHINITIS: ICD-10-CM

## 2020-09-10 DIAGNOSIS — E03.9 HYPOTHYROID: ICD-10-CM

## 2020-09-10 DIAGNOSIS — Z12.11 COLON CANCER SCREENING: ICD-10-CM

## 2020-09-10 DIAGNOSIS — I10 ESSENTIAL HYPERTENSION: ICD-10-CM

## 2020-09-10 RX ORDER — ONDANSETRON 4 MG/1
TABLET, FILM COATED ORAL
Qty: 30 TABLET | Refills: 1 | Status: SHIPPED | OUTPATIENT
Start: 2020-09-10 | End: 2021-09-21

## 2020-09-10 RX ORDER — TRIAMTERENE/HYDROCHLOROTHIAZID 37.5-25 MG
1 TABLET ORAL DAILY
Qty: 90 TABLET | Refills: 3 | Status: SHIPPED | OUTPATIENT
Start: 2020-09-10 | End: 2021-09-21

## 2020-09-10 RX ORDER — ROSUVASTATIN CALCIUM 10 MG/1
10 TABLET, COATED ORAL AT BEDTIME
Qty: 90 TABLET | Refills: 3 | Status: SHIPPED | OUTPATIENT
Start: 2020-09-10 | End: 2021-09-21

## 2020-09-10 RX ORDER — AZELASTINE 1 MG/ML
1 SPRAY, METERED NASAL 2 TIMES DAILY
Qty: 30 ML | Refills: 12 | Status: SHIPPED | OUTPATIENT
Start: 2020-09-10 | End: 2021-12-28

## 2020-09-10 ASSESSMENT — MIFFLIN-ST. JEOR: SCORE: 1110.05

## 2020-10-05 ENCOUNTER — COMMUNICATION - HEALTHEAST (OUTPATIENT)
Dept: INTERNAL MEDICINE | Facility: CLINIC | Age: 75
End: 2020-10-05

## 2020-10-16 ENCOUNTER — RECORDS - HEALTHEAST (OUTPATIENT)
Dept: ADMINISTRATIVE | Facility: OTHER | Age: 75
End: 2020-10-16

## 2021-03-21 ENCOUNTER — COMMUNICATION - HEALTHEAST (OUTPATIENT)
Dept: FAMILY MEDICINE | Facility: CLINIC | Age: 76
End: 2021-03-21

## 2021-03-21 DIAGNOSIS — E03.9 HYPOTHYROID: ICD-10-CM

## 2021-03-22 RX ORDER — LEVOTHYROXINE SODIUM 75 UG/1
TABLET ORAL
Qty: 90 TABLET | Refills: 0 | Status: SHIPPED | OUTPATIENT
Start: 2021-03-22 | End: 2021-09-21

## 2021-04-13 ENCOUNTER — RECORDS - HEALTHEAST (OUTPATIENT)
Dept: INTERNAL MEDICINE | Facility: CLINIC | Age: 76
End: 2021-04-13

## 2021-04-13 DIAGNOSIS — Z12.31 VISIT FOR SCREENING MAMMOGRAM: ICD-10-CM

## 2021-05-28 ENCOUNTER — RECORDS - HEALTHEAST (OUTPATIENT)
Dept: ADMINISTRATIVE | Facility: CLINIC | Age: 76
End: 2021-05-28

## 2021-05-28 NOTE — TELEPHONE ENCOUNTER
Refill Approved    Rx renewed per Medication Renewal Policy. Medication was last renewed on 8/7/2018 with 2 refills.   Last office visit: 3/19/2019 establishing care with A Michelle NP @ [facility]    Sadie Vicente, [facility] Triage/Med Refill 5/14/2019     Requested Prescriptions   Pending Prescriptions Disp Refills     rosuvastatin (CRESTOR) 10 MG tablet [Pharmacy Med Name: ROSUVASTATIN TAB 10MG TABLET] 90 tablet 2     Sig: TAKE ONE TABLET BY MOUTH AT BEDTIME       Statins Refill Protocol (Hmg CoA Reductase Inhibitors) Passed - 5/14/2019  9:23 AM        Passed - PCP or prescribing provider visit in past 12 months      Last office visit with prescriber/PCP: 3/8/2018 Neetu Regan MD OR same dept: 10/24/2018 Mio Tabares CNP OR same specialty: 10/24/2018 Mio Tabares CNP  Last physical: 11/13/2018 Last MTM visit: Visit date not found   Next visit within 3 mo: Visit date not found  Next physical within 3 mo: Visit date not found  Prescriber OR PCP: Neetu Regan MD  Last diagnosis associated with med order: 1. Mixed hyperlipidemia  - rosuvastatin (CRESTOR) 10 MG tablet [Pharmacy Med Name: ROSUVASTATIN TAB 10MG TABLET]; TAKE ONE TABLET BY MOUTH AT BEDTIME  Dispense: 90 tablet; Refill: 2    If protocol passes may refill for 12 months if within 3 months of last provider visit (or a total of 15 months).

## 2021-05-28 NOTE — TELEPHONE ENCOUNTER
Refill Approved    Rx renewed per Medication Renewal Policy. Medication was last renewed on 10/24/18.    Rose Kerr, [facility] Triage/Med Refill 4/29/2019     Requested Prescriptions   Pending Prescriptions Disp Refills     triamterene-hydrochlorothiazide (MAXZIDE-25) 37.5-25 mg per tablet [Pharmacy Med Name: TRIAMT/HCTZ  TAB 37.5-25 TABLET] 90 tablet 1     Sig: TAKE 1 TABLET BY MOUTH DAILY.       Diuretics/Combination Diuretics Refill Protocol  Passed - 4/27/2019  9:16 AM        Passed - Visit with PCP or prescribing provider visit in past 12 months     Last office visit with prescriber/PCP: 10/24/2018 Mio Tabares CNP OR same dept: 10/24/2018 Mio Tabares CNP OR same specialty: 10/24/2018 Mio Tabares CNP  Last physical: Visit date not found Last MTM visit: Visit date not found   Next visit within 3 mo: Visit date not found  Next physical within 3 mo: Visit date not found  Prescriber OR PCP: Mio Tabares CNP  Last diagnosis associated with med order: There are no diagnoses linked to this encounter.  If protocol passes may refill for 12 months if within 3 months of last provider visit (or a total of 15 months).             Passed - Serum Potassium in past 12 months      Lab Results   Component Value Date    Potassium 3.5 11/30/2018             Passed - Serum Sodium in past 12 months      Lab Results   Component Value Date    Sodium 143 11/30/2018             Passed - Blood pressure on file in past 12 months     BP Readings from Last 1 Encounters:   03/19/19 122/82             Passed - Serum Creatinine in past 12 months      Creatinine   Date Value Ref Range Status   11/30/2018 0.77 0.60 - 1.10 mg/dL Final

## 2021-05-29 ENCOUNTER — RECORDS - HEALTHEAST (OUTPATIENT)
Dept: ADMINISTRATIVE | Facility: CLINIC | Age: 76
End: 2021-05-29

## 2021-05-30 ENCOUNTER — RECORDS - HEALTHEAST (OUTPATIENT)
Dept: ADMINISTRATIVE | Facility: CLINIC | Age: 76
End: 2021-05-30

## 2021-05-30 VITALS — BODY MASS INDEX: 26.91 KG/M2 | WEIGHT: 142.4 LBS

## 2021-05-30 VITALS — WEIGHT: 144 LBS | BODY MASS INDEX: 27.21 KG/M2

## 2021-05-30 VITALS — HEIGHT: 61 IN | BODY MASS INDEX: 27.19 KG/M2 | WEIGHT: 144 LBS

## 2021-05-30 VITALS — WEIGHT: 142 LBS | BODY MASS INDEX: 26.83 KG/M2

## 2021-05-31 ENCOUNTER — RECORDS - HEALTHEAST (OUTPATIENT)
Dept: ADMINISTRATIVE | Facility: CLINIC | Age: 76
End: 2021-05-31

## 2021-05-31 VITALS — BODY MASS INDEX: 27.47 KG/M2 | WEIGHT: 145.38 LBS

## 2021-05-31 VITALS — WEIGHT: 149 LBS | BODY MASS INDEX: 28.15 KG/M2

## 2021-05-31 VITALS — WEIGHT: 150 LBS | BODY MASS INDEX: 28.34 KG/M2

## 2021-06-01 VITALS — WEIGHT: 145.1 LBS | BODY MASS INDEX: 27.42 KG/M2

## 2021-06-01 VITALS — BODY MASS INDEX: 27.79 KG/M2 | WEIGHT: 147.1 LBS

## 2021-06-01 VITALS — WEIGHT: 145 LBS | BODY MASS INDEX: 27.4 KG/M2

## 2021-06-01 VITALS — WEIGHT: 143.3 LBS | BODY MASS INDEX: 27.08 KG/M2

## 2021-06-02 ENCOUNTER — RECORDS - HEALTHEAST (OUTPATIENT)
Dept: ADMINISTRATIVE | Facility: CLINIC | Age: 76
End: 2021-06-02

## 2021-06-02 VITALS — BODY MASS INDEX: 28.53 KG/M2 | WEIGHT: 151 LBS

## 2021-06-02 VITALS — HEIGHT: 61 IN | BODY MASS INDEX: 27.64 KG/M2 | WEIGHT: 146.4 LBS

## 2021-06-02 VITALS — HEIGHT: 61 IN | WEIGHT: 150 LBS | BODY MASS INDEX: 28.32 KG/M2

## 2021-06-02 VITALS — BODY MASS INDEX: 28.04 KG/M2 | WEIGHT: 148.4 LBS

## 2021-06-02 VITALS — WEIGHT: 147 LBS | BODY MASS INDEX: 27.78 KG/M2

## 2021-06-02 VITALS — BODY MASS INDEX: 27.78 KG/M2 | WEIGHT: 147 LBS

## 2021-06-02 NOTE — TELEPHONE ENCOUNTER
Refill Approved    Rx renewed per Medication Renewal Policy. Medication was last renewed on 4/29/19.    Rose Kerr, [facility] Triage/Med Refill 10/25/2019     Requested Prescriptions   Pending Prescriptions Disp Refills     triamterene-hydrochlorothiazide (MAXZIDE-25) 37.5-25 mg per tablet [Pharmacy Med Name: TRIAMT/HCTZ  TAB 37.5-25 TABLET] 90 tablet 1     Sig: TAKE 1 TABLET BY MOUTH DAILY.       Diuretics/Combination Diuretics Refill Protocol  Passed - 10/25/2019  8:48 AM        Passed - Visit with PCP or prescribing provider visit in past 12 months     Last office visit with prescriber/PCP: 3/19/2019 Emili Martinez NP OR same dept: Visit date not found OR same specialty: 10/24/2018 Mio Tabares CNP  Last physical: Visit date not found Last MTM visit: Visit date not found   Next visit within 3 mo: Visit date not found  Next physical within 3 mo: Visit date not found  Prescriber OR PCP: Emili Martinez NP  Last diagnosis associated with med order: 1. Essential hypertension  - triamterene-hydrochlorothiazide (MAXZIDE-25) 37.5-25 mg per tablet [Pharmacy Med Name: TRIAMT/HCTZ  TAB 37.5-25 TABLET]; TAKE 1 TABLET BY MOUTH DAILY.  Dispense: 90 tablet; Refill: 1    If protocol passes may refill for 12 months if within 3 months of last provider visit (or a total of 15 months).             Passed - Serum Potassium in past 12 months      Lab Results   Component Value Date    Potassium 3.5 11/30/2018             Passed - Serum Sodium in past 12 months      Lab Results   Component Value Date    Sodium 143 11/30/2018             Passed - Blood pressure on file in past 12 months     BP Readings from Last 1 Encounters:   05/21/19 135/78             Passed - Serum Creatinine in past 12 months      Creatinine   Date Value Ref Range Status   11/30/2018 0.77 0.60 - 1.10 mg/dL Final

## 2021-06-02 NOTE — TELEPHONE ENCOUNTER
Refill Approved    Rx renewed per Medication Renewal Policy. Medication was last renewed on 3/12/19, last OV 5/21/19.    Danielle Somers, [facility] Triage/Med Refill 10/13/2019     Requested Prescriptions   Pending Prescriptions Disp Refills     omeprazole (PRILOSEC) 20 MG capsule [Pharmacy Med Name: OMEPRAZOLE   CAP 20MG CAPSULE] 180 capsule 1     Sig: TAKE 1 CAPSULE BY MOUTH TWICE DAILY       GI Medications Refill Protocol Passed - 10/12/2019 10:42 AM        Passed - PCP or prescribing provider visit in last 12 or next 3 months.     Last office visit with prescriber/PCP: 3/8/2018 Neetu Regan MD OR same dept: 10/24/2018 Moi Tabares CNP OR same specialty: 10/24/2018 Mio Tabares CNP  Last physical: 11/13/2018 Last MTM visit: Visit date not found   Next visit within 3 mo: Visit date not found  Next physical within 3 mo: Visit date not found  Prescriber OR PCP: Neetu Regan MD  Last diagnosis associated with med order: There are no diagnoses linked to this encounter.  If protocol passes may refill for 12 months if within 3 months of last provider visit (or a total of 15 months).

## 2021-06-03 NOTE — TELEPHONE ENCOUNTER
RN cannot approve Refill Request    RN can NOT refill this medication med is not covered by policy/route to provider. Last office visit: 3/8/2018 Neetu Regan MD Last Physical: 11/13/2018 Last MTM visit: Visit date not found Last visit same specialty: 10/24/2018 Mio Tabares CNP.  Next visit within 3 mo: Visit date not found  Next physical within 3 mo: Visit date not found      Sadie Vicente, [facility] Triage/Med Refill 11/16/2019    Requested Prescriptions   Pending Prescriptions Disp Refills     ondansetron (ZOFRAN) 4 MG tablet [Pharmacy Med Name: ONDANSETRON 4MG TAB  TABLET] 30 tablet 1     Sig: TAKE ONE TABLET BY MOUTH DAILY AS NEEDED FOR NAUSEA       There is no refill protocol information for this order

## 2021-06-04 VITALS
OXYGEN SATURATION: 99 % | HEIGHT: 61 IN | HEART RATE: 80 BPM | BODY MASS INDEX: 28.07 KG/M2 | DIASTOLIC BLOOD PRESSURE: 80 MMHG | SYSTOLIC BLOOD PRESSURE: 116 MMHG | WEIGHT: 148.7 LBS | TEMPERATURE: 98.7 F

## 2021-06-04 VITALS
WEIGHT: 150.5 LBS | SYSTOLIC BLOOD PRESSURE: 138 MMHG | BODY MASS INDEX: 28.44 KG/M2 | HEART RATE: 94 BPM | OXYGEN SATURATION: 98 % | DIASTOLIC BLOOD PRESSURE: 70 MMHG

## 2021-06-04 VITALS
DIASTOLIC BLOOD PRESSURE: 73 MMHG | SYSTOLIC BLOOD PRESSURE: 134 MMHG | BODY MASS INDEX: 28.21 KG/M2 | OXYGEN SATURATION: 99 % | HEART RATE: 95 BPM | HEIGHT: 61 IN | WEIGHT: 149.4 LBS

## 2021-06-04 NOTE — TELEPHONE ENCOUNTER
RN cannot approve Refill Request    RN can NOT refill this medication Protocol failed and NO refill given.         Rose Kerr, [facility] Triage/Med Refill 12/31/2019    Requested Prescriptions   Pending Prescriptions Disp Refills     levothyroxine (SYNTHROID, LEVOTHROID) 75 MCG tablet [Pharmacy Med Name: LEVOTHYROXINE 75MCG TAB  TABLET] 90 tablet 2     Sig: TAKE 1 TABLET BY MOUTH DAILY AS DIRECTED       Thyroid Hormones Protocol Failed - 12/30/2019  8:43 AM        Failed - TSH on file in past 12 months for patient age 12 & older     TSH   Date Value Ref Range Status   10/02/2018 1.24 0.30 - 5.00 uIU/mL Final                   Passed - Provider visit in past 12 months or next 3 months     Last office visit with prescriber/PCP: 3/8/2018 Neetu Regan MD OR same dept: Visit date not found OR same specialty: 10/24/2018 Mio Taabres, ANNA  Last physical: 11/13/2018 Last MTM visit: Visit date not found   Next visit within 3 mo: Visit date not found  Next physical within 3 mo: Visit date not found  Prescriber OR PCP: Neetu Regan MD  Last diagnosis associated with med order: 1. Hypothyroid  - levothyroxine (SYNTHROID, LEVOTHROID) 75 MCG tablet [Pharmacy Med Name: LEVOTHYROXINE 75MCG TAB  TABLET]; TAKE 1 TABLET BY MOUTH DAILY AS DIRECTED  Dispense: 90 tablet; Refill: 2    If protocol passes may refill for 12 months if within 3 months of last provider visit (or a total of 15 months).

## 2021-06-05 NOTE — PROGRESS NOTES
Assessment and Plan:     1. Routine general medical examination at a health care facility  - Hepatitis C Antibody (Anti-HCV)    2. Hypothyroidism, unspecified type  Will adjust Levothyroxine dose based on labs.   - Thyroid Cascade    3. Mixed hyperlipidemia  - Lipid Cascade FASTING  - rosuvastatin (CRESTOR) 10 MG tablet; Take 1 tablet (10 mg total) by mouth at bedtime.  Dispense: 90 tablet; Refill: 3    4. Essential hypertension  Well controlled.  - Basic Metabolic Panel  - triamterene-hydrochlorothiazide (MAXZIDE-25) 37.5-25 mg per tablet; Take 1 tablet by mouth daily.  Dispense: 90 tablet; Refill: 3      The patient's current medical problems were reviewed.    I have had an Advance Directives discussion with the patient.  The following health maintenance schedule was reviewed with the patient and provided in printed form in the after visit summary:   Health Maintenance   Topic Date Due     HEPATITIS C SCREENING  1945     ZOSTER VACCINES (1 of 2) 09/20/1995     PNEUMOCOCCAL IMMUNIZATION 65+ LOW/MEDIUM RISK (2 of 2 - PPSV23) 01/18/2018     DXA SCAN  12/22/2018     INFLUENZA VACCINE RULE BASED (1) 08/01/2019     MEDICARE ANNUAL WELLNESS VISIT  11/13/2019     TD 18+ HE  06/22/2020     COLONOSCOPY  11/04/2020     MAMMOGRAM  01/02/2021     FALL RISK ASSESSMENT  01/20/2021     LIPID  11/13/2023     ADVANCE CARE PLANNING  11/13/2023        Subjective:   Chief Complaint: Chanel Regan is an 74 y.o. female here for an Annual Wellness visit.     HPI: Patient is feeling well today.  She just got home from a trip to [place].    On Maxide for hypertension.  Patient is not checking blood pressures at all at home.  Denies any dizziness or lightheadedness.    Statin medication and tolerating well.    On levothyroxine for hypothyroidism.  She denies any symptoms with this.  She is very concerned about her weight, although it appears that she is actually lost weight since her last visit.  She is specifically asking about  the ketogenic or Atkins diet.  She is not exercising regularly.    Patient reports that she does not hear the best, but she is not interested in an audiology referral at this time.    History of osteoporosis, but she has chosen not to continue treating this.    Review of Systems: Please see above.  The rest of the review of systems are negative for all systems.    Patient Care Team:  Emili Martinez NP as PCP - General (Family Medicine)  Emili Martinez NP as Assigned PCP     Patient Active Problem List   Diagnosis     Allergic Rhinitis     Anxiety     GERD (gastroesophageal reflux disease)     Osteoporosis     Hypothyroidism     Vitamin D Deficiency     Hyperlipidemia     Hypertension     Past Medical History:   Diagnosis Date     Anxiety      GERD (gastroesophageal reflux disease)      Hemorrhoids      Hyperlipidemia      Hypertension      Hypothyroidism      Osteoporosis      Pneumonia       Past Surgical History:   Procedure Laterality Date     NO PAST SURGERIES        Family History   Problem Relation Age of Onset     Breast cancer Sister      Osteoporosis Sister      Lung cancer Sister      COPD Sister       Social History     Socioeconomic History     Marital status:      Spouse name: Not on file     Number of children: Not on file     Years of education: Not on file     Highest education level: Not on file   Occupational History     Not on file   Social Needs     Financial resource strain: Not on file     Food insecurity:     Worry: Not on file     Inability: Not on file     Transportation needs:     Medical: Not on file     Non-medical: Not on file   Tobacco Use     Smoking status: Never Smoker     Smokeless tobacco: Never Used   Substance and Sexual Activity     Alcohol use: No     Drug use: Not on file     Sexual activity: Yes     Partners: Male     Birth control/protection: Post-menopausal   Lifestyle     Physical activity:     Days per week: Not on file     Minutes per session: Not on file  "    Stress: Not on file   Relationships     Social connections:     Talks on phone: Not on file     Gets together: Not on file     Attends Latter day service: Not on file     Active member of club or organization: Not on file     Attends meetings of clubs or organizations: Not on file     Relationship status: Not on file     Intimate partner violence:     Fear of current or ex partner: Not on file     Emotionally abused: Not on file     Physically abused: Not on file     Forced sexual activity: Not on file   Other Topics Concern     Not on file   Social History Narrative     Not on file      Current Outpatient Medications   Medication Sig Dispense Refill     azelastine (ASTELIN) 137 mcg (0.1 %) nasal spray 1 spray into each nostril 2 (two) times a day. Use in each nostril as directed 30 mL 12     cholecalciferol, vitamin D3, 1,000 unit tablet Take 5,000 Units by mouth daily.        levothyroxine (SYNTHROID, LEVOTHROID) 75 MCG tablet TAKE 1 TABLET BY MOUTH DAILY AS DIRECTED 90 tablet 0     omeprazole (PRILOSEC) 20 MG capsule Take 1 capsule (20 mg total) by mouth 2 (two) times a day. 180 capsule 1     ondansetron (ZOFRAN) 4 MG tablet TAKE ONE TABLET BY MOUTH DAILY AS NEEDED FOR NAUSEA 30 tablet 1     rosuvastatin (CRESTOR) 10 MG tablet Take 1 tablet (10 mg total) by mouth at bedtime. 90 tablet 3     triamterene-hydrochlorothiazide (MAXZIDE-25) 37.5-25 mg per tablet TAKE 1 TABLET BY MOUTH DAILY. 90 tablet 0     No current facility-administered medications for this visit.       Objective:   Vital Signs:   Visit Vitals  /80   Pulse 80   Temp 98.7  F (37.1  C)   Ht 5' 1\" (1.549 m)   Wt 148 lb 11.2 oz (67.4 kg)   SpO2 99%   BMI 28.10 kg/m         VisionScreening:  No exam data present     PHYSICAL EXAM  General Appearance: Alert, cooperative, no distress, appears stated age  Head: Normocephalic, without obvious abnormality, atraumatic  Eyes: PERRL, conjunctiva/corneas clear, EOM's intact. Wearing corrective " glasses.  Ears: Normal TM's and external ear canals, both ears  Nose: Nares normal, septum midline,mucosa normal, no drainage  Throat: Lips, mucosa, and tongue normal; teeth and gums normal  Neck: Supple, symmetrical, trachea midline, no adenopathy;  thyroid: not enlarged, symmetric, no tenderness/mass/nodules; no carotid bruit or JVD  Back: no CVA tenderness  Lungs: Clear to auscultation bilaterally, respirations unlabored  Breasts: No breast masses, tenderness, asymmetry, or nipple discharge.  Heart: Regular rate and rhythm, S1 and S2 normal, no murmur, rub, or gallop  Abdomen: Soft, non-tender, bowel sounds active all four quadrants,  no masses, no organomegaly  Extremities: Extremities normal, atraumatic, no cyanosis or edema  Skin: Skin color, texture, turgor normal, no rashes  Lymph nodes: Cervical, supraclavicular, and axillary nodes normal  Neurologic: Normal   Psychologic: appropriate affective, answers all of my questions appropriately. No hallucinations, delusion, or suicidal ideations.      Assessment Results 1/20/2020   Activities of Daily Living No help needed   Instrumental Activities of Daily Living No help needed   Mini Cog Total Score 5   Some recent data might be hidden     A Mini-Cog score of 0-2 suggests the possibility of dementia, score of 3-5 suggests no dementia    Identified Health Risks:     She is at risk for lack of exercise and has been provided with information to increase physical activity for the benefit of her well-being.  The patient was counseled and encouraged to consider modifying their diet and eating habits. She was provided with information on recommended healthy diet options.  The patient was provided with written information regarding signs of hearing loss.  Patient's advanced directive was discussed and I am comfortable with the patient's wishes.    Emili Martinez NP

## 2021-06-06 NOTE — TELEPHONE ENCOUNTER
Central PA team  [phone]  Pool: [place] PA MED ([id number])          PA has been initiated.       Case already started for patient #[id number] sent in additional information. Marked for urgent review      Response will be received via fax and may take up to 5-10 business days depending on plan

## 2021-06-06 NOTE — TELEPHONE ENCOUNTER
Prior Authorization Request  Who s requesting:  Patient  Pharmacy Name and Location: [facility]  Medication Name:   omeprazole (PRILOSEC) 20 MG capsule  180 capsule  1  10/13/2019      Sig - Route: Take 1 capsule (20 mg total) by mouth 2 (two) times a day. - Oral     Sent to pharmacy as: omeprazole 20 mg capsule,delayed release (PriLOSEC)     E-Prescribing Status: Receipt confirmed by pharmacy (10/13/2019  9:45 AM CDT)         Insurance Plan: [employer]  Insurance Member ID Number:  [id number]  CoverMyMeds Key: [id number]  Informed patient that prior authorizations can take up to 10 business days for response:   Yes  Okay to leave a detailed message: Yes

## 2021-06-07 NOTE — TELEPHONE ENCOUNTER
Central PA team  [phone]  Pool: [place] PA MED ([id number])          PA has been initiated.       PA form completed and faxed insurance via Cover My Meds     Key:  [id number]      Medication:  Omeprazole     Insurance:  [employer]         Response will be received via fax and may take up to 5-10 business days depending on plan

## 2021-06-07 NOTE — TELEPHONE ENCOUNTER
FYI - This is now approved, pharmacy will just need a new 90 day Rx for next fill. They filled a 60 day supply from the remainder of the Rx they had.  Thanks!

## 2021-06-07 NOTE — TELEPHONE ENCOUNTER
PA APPROVED:    Approval start date: 1/1/2020  Approval end date:  4/27/2023    Pharmacy has been notified of approval and will contact patient when medication is ready for pickup.       Case# [id number]    Per call to [employer] this has been approved on appeal.    Will copy letter once received.

## 2021-06-08 NOTE — PROGRESS NOTES
ASSESSMENT and PLAN:  1. HTN (hypertension)  She's had several elevated readings.  With her elevated Ca, I don't think chlorthalidone is the best choice.  She also had to take K while on it.  We'll add lisinopril instead and have short term f/u.  Med SE reviewed.  - lisinopril (PRINIVIL,ZESTRIL) 10 MG tablet; Take 1 tablet (10 mg total) by mouth daily.  Dispense: 90 tablet; Refill: 3    2. Osteoporosis  She wont' take alendonrate.  She's off of prednisone.  I rec f/u to discuss next options.    Patient Instructions   Please set up a visit w/ Dr. Mcgowan or endocrine to discuss treatment of your osteoporosis.  Please add lisinopril 10mg for your blood pressure.  See me for follow up of your blood pressure in 2-3 weeks.  We'll check labs at that time; if it's at a time when you're able to fast we can recheck your cholesterol too.  Take care!        CHIEF COMPLAINT:  Chief Complaint   Patient presents with     Follow-up     HTN, questions about Crestor, pneumonia       HISTORY OF PRESENT ILLNESS:  Chanel Regan is a 71 y.o. female  presenting to the clinic today for a hypertension follow up.     Pneumonia: Her oxygen yesterday was 97%. She believes everything is going well, but was told that there is still a little bit of inflammation still left in her lungs. She was notified that it could take around a year to completely clear up. She performs her lung exercises during which her chest still hurts sometimes. She is going to follow up in 3 months.     Osteoporosis: She had a bone density scan, and noticed that her results have worsened. She has taken Fosamax before, but did not like the medication. When she as on Fosamax she experienced an upset stomach. Her sister is on currently taking alendronate. She would prefer something natural like calcium supplements.     Hypertension: Her blood pressure was 142 systolic yesterday. She previously took chlorthalidone for water weight  In the past, and currently feels that  she is still swollen. She cannot recall having an issue with the medication. She has not previously taken lisinopril, but her sister might be. She is not having any symptoms related to her blood pressure. Her blood pressure today was 140/70.     Health Maintenance: She received her influenza vaccine today.     REVIEW OF SYSTEMS:   She is not currently taking potassium supplements. She is experiencing some muscle pain while on the Crestor, so she takes ibuprofen to manage. She still has the sensation of abdominal fullness. She takes probiotic supplements. All other systems are negative.    PFSH:  There is family history of osteoporosis. She is planning on traveling soon.       TOBACCO USE:  History   Smoking Status     Never Smoker   Smokeless Tobacco     Never Used       VITALS:  Vitals:    01/18/17 1154   BP: 140/70   Patient Site: Right Arm   Pulse: 96   Weight: 142 lb (64.4 kg)     Wt Readings from Last 3 Encounters:   01/18/17 142 lb (64.4 kg)   01/17/17 142 lb 6.4 oz (64.6 kg)   12/14/16 142 lb (64.4 kg)       PHYSICAL EXAM:  Constitutional:  Reveals an alert, pleasant middle aged female.   Vitals:  Noted.   Neurologic: Normal     ADDITIONAL HISTORY SUMMARIZED (2): Reviewed note from 1/17/16 regarding lung process.   DECISION TO OBTAIN EXTRA INFORMATION (1): None.   RADIOLOGY TESTS (1): None.  LABS (1): None.  MEDICINE TESTS (1): None.  INDEPENDENT REVIEW (2 each): None.     The visit lasted a total of 12 minutes face to face with the patient. Over 50% of the time was spent counseling and educating the patient about pneumonia.    I, Camryn Giles, am scribing for and in the presence of, Dr. Neetu Regan.    I, Dr. Neetu Regan, personally performed the services described in this documentation, as scribed by Camryn Giles in my presence, and it is both accurate and complete.    MEDICATIONS:  Current Outpatient Prescriptions   Medication Sig Dispense Refill     azelastine (ASTELIN) 137 mcg (0.1 %) nasal  spray 1 spray into each nostril 2 (two) times a day. Use in each nostril as directed 30 mL 12     B&C-FA-zinc-copper oxide-vit e (STRESS B-COMPLEX) 500-400-23.9-3 mg-mcg-mg-mg Tab Take 1 tablet by mouth daily.       cholecalciferol, vitamin D3, 1,000 unit tablet Take 5,000 Units by mouth daily.        Lactobacillus rhamnosus GG (CULTURELLE) 10-15 Billion cell capsule Take 1 capsule by mouth daily.       lactulose 10 gram/15 mL solution Take 15ml twice daily as needed for constipation 240 mL 0     levothyroxine (SYNTHROID, LEVOTHROID) 75 MCG tablet TAKE 1 TABLET BY MOUTH DAILY AS DIRECTED 90 tablet 3     omeprazole (PRILOSEC) 20 MG capsule TAKE 1 CAPSULE BY MOUTH TWICE DAILY 180 capsule 0     rosuvastatin (CRESTOR) 20 MG tablet Take 1 tablet (20 mg total) by mouth bedtime. 90 tablet 3     VENTOLIN HFA 90 mcg/actuation inhaler Inhale 2 puffs every 6 (six) hours as needed for wheezing. 1 Inhaler 6     vitamin E 400 UNIT capsule Take 400 Units by mouth daily.       aspirin 81 MG EC tablet Take 81 mg by mouth daily.       lisinopril (PRINIVIL,ZESTRIL) 10 MG tablet Take 1 tablet (10 mg total) by mouth daily. 90 tablet 3     predniSONE (DELTASONE) 1 MG tablet When done with your 5mg tablets, take four of these for a week, then three for a week, then two for a week, then one table for a week. 70 tablet 0     predniSONE (DELTASONE) 5 MG tablet Take 1 tablet (5 mg total) by mouth daily. 10 tablet 0     No current facility-administered medications for this visit.        Total data points: 2

## 2021-06-09 NOTE — PROGRESS NOTES
ASSESSMENT and PLAN:  1. Hypertension  Much improved w/ addition of lisinopril   - Comprehensive Metabolic Panel    2. Hyperlipidemia  She's tolerated crestor for a time, but she's now worried about muscle pain.  Labs today and then she's going to drop back to 10mg.  She'll update me on her sx.  We could drop her back to 5mg if needed.  - Lipid Cascade  - CK Total  - rosuvastatin (CRESTOR) 10 MG tablet; Take 1 tablet (10 mg total) by mouth bedtime.  Dispense: 45 tablet; Refill: 3    Patient Instructions   Please set up an appointment with Dr. Lyons for your bone density.  Continue your lisinopril.  Try crestor at 10mg.  Today's labs will be available on [employer].  If you aren't signed up, then we'll mail them out.  If anything needs more immediate follow up, we'll also call.  Take care!      Medications Discontinued During This Encounter   Medication Reason     rosuvastatin (CRESTOR) 20 MG tablet Reorder     rosuvastatin (CRESTOR) 10 MG tablet Reorder       Return in about 6 months (around 8/28/2017).    CHIEF COMPLAINT:  Chief Complaint   Patient presents with     Follow-up     Lisinopril     Labs Only     Fasting lab work        HISTORY OF PRESENT ILLNESS:  Chanel Regan is a 71 y.o. female  presenting to the clinic today for medication follow up follow up. She is experiencing muscle pains from the Crestor. She has not tried cutting the pill in half. She does need a refill on the lisinopril yet. Her blood pressure today was 112/62.     Osteoporosis: She does not like the medication Fosamax.     REVIEW OF SYSTEMS:   She has mucus in her throat from the post nasal drainage and her allergies. She is concerned she may have pneumonia because she was not aware the first time she had pneumonia. When she was on Lipitor she had trouble with memory. All other systems are negative.    TOBACCO USE:  History   Smoking Status     Never Smoker   Smokeless Tobacco     Never Used       VITALS:  Vitals:    02/28/17 1514    BP: 112/62   Patient Site: Right Arm   Pulse: 64   Resp: 14     Wt Readings from Last 3 Encounters:   01/18/17 142 lb (64.4 kg)   01/17/17 142 lb 6.4 oz (64.6 kg)   12/14/16 142 lb (64.4 kg)         PHYSICAL EXAM:  Constitutional:  Reveals an alert, pleasant middle aged female.   Vitals:  Noted.   Lungs: Clear to auscultation bilaterally, respirations unlabored.   Heart: Regular rate and rhythm, S1 and S2 normal, no murmur, rub, or gallop, Neurologic: Normal     ADDITIONAL HISTORY SUMMARIZED (2): Reviewed note from 1/17/17 regarding pulmonary process.   DECISION TO OBTAIN EXTRA INFORMATION (1): None.   RADIOLOGY TESTS (1): None.  LABS (1): Reviewed labs from 11/9/16. Ordered labs.   MEDICINE TESTS (1): None.  INDEPENDENT REVIEW (2 each): None.     The visit lasted a total of 11 minutes face to face with the patient. Over 50% of the time was spent counseling and educating the patient about lung process.    I, Camryn Giles, am scribing for and in the presence of, Dr. Neetu Regan.    I, Dr. Neetu Regan, personally performed the services described in this documentation, as scribed by Camryn Giles in my presence, and it is both accurate and complete.    MEDICATIONS:  Current Outpatient Prescriptions   Medication Sig Dispense Refill     aspirin 81 MG EC tablet Take 81 mg by mouth daily.       azelastine (ASTELIN) 137 mcg (0.1 %) nasal spray 1 spray into each nostril 2 (two) times a day. Use in each nostril as directed 30 mL 12     B&C-FA-zinc-copper oxide-vit e (STRESS B-COMPLEX) 500-400-23.9-3 mg-mcg-mg-mg Tab Take 1 tablet by mouth daily.       cholecalciferol, vitamin D3, 1,000 unit tablet Take 5,000 Units by mouth daily.        Lactobacillus rhamnosus GG (CULTURELLE) 10-15 Billion cell capsule Take 1 capsule by mouth daily.       lactulose 10 gram/15 mL solution Take 15ml twice daily as needed for constipation 240 mL 0     levothyroxine (SYNTHROID, LEVOTHROID) 75 MCG tablet TAKE 1 TABLET BY MOUTH DAILY  AS DIRECTED 90 tablet 3     lisinopril (PRINIVIL,ZESTRIL) 10 MG tablet Take 1 tablet (10 mg total) by mouth daily. 90 tablet 3     omeprazole (PRILOSEC) 20 MG capsule TAKE 1 CAPSULE BY MOUTH TWICE DAILY 180 capsule 0     predniSONE (DELTASONE) 1 MG tablet When done with your 5mg tablets, take four of these for a week, then three for a week, then two for a week, then one table for a week. 70 tablet 0     rosuvastatin (CRESTOR) 10 MG tablet Take 1 tablet (10 mg total) by mouth bedtime. 45 tablet 3     vitamin E 400 UNIT capsule Take 400 Units by mouth daily.       predniSONE (DELTASONE) 5 MG tablet Take 1 tablet (5 mg total) by mouth daily. 10 tablet 0     VENTOLIN HFA 90 mcg/actuation inhaler Inhale 2 puffs every 6 (six) hours as needed for wheezing. 1 Inhaler 6     No current facility-administered medications for this visit.        Total data points: 3

## 2021-06-10 NOTE — PROGRESS NOTES
HPI: This patient is a 72yo F who presents for evaluation of a chronic dry cough. There has been a globus sensation for several months, accompanied with occasional hoarseness, PND, and dry cough. Denies fevers, otalgia, weight loss, odynophagia, dysphagia, hemoptysis, and shortness of breath. Does not complain of sour taste, heartburn, or burping; but carries a GERD diagnosis and is supposed to be taking reflux medication. She admits that she does not take it regularly. Has also had lifelong allergies and uses saline. States she tried a nasal spray for a week, but stopped because it didn't work fast enough.    Past medical history, surgical history, social history, family history, medications, and allergies have been reviewed with the patient and are documented above.    Review of Systems: a 10-system review was performed. Pertinent positives are noted in the HPI and on a separate scanned document in the chart.    PHYSICAL EXAMINATION:  GEN: no acute distress, normocephalic. Pressured speech, high anxiety  EYES: extraocular movements are intact, pupils are equal and round. Sclera clear.   EARS: auricles are normally formed. The external auditory canals are clear with minimal to no cerumen. Tympanic membranes are intact bilaterally with no signs of infection, effusion, retractions, or perforations.  NOSE: anterior nares are patent. There are no masses or lesions. The septum is non-obstructing. Mild allergic inflammation of the turbinates  OC/OP: clear, dentition is in good repair. The tongue and palate are fully mobile and symmetric. The floor of mouth, base of tongue, and tonsils are soft and symmetric; few tonsil stones noted. Cobblestoning of the posterior pharyngeal wall.  HP/L (scope): nasopharynx, base of tongue, vallecula, epiglottis, and pyriform sinuses are clear. The bilateral vocal folds are mobile and without lesion. There is interarytenoid edema and hyperemia.  NECK: soft and supple. No lymphadenopathy  or masses. Airway is midline.  NEURO: CN II-XII are intact bilaterally. alert and oriented. No nystagmus. Gait is normal.  PULM: breathing comfortably on room air, normal chest expansion with respiration  HEART: regular rate and rhythm, no peripheral edema    MEDICAL DECISION-MAKING: This patient is a 70yo F with chronic laryngitis from acid reflux and to a smaller degree allergies. Her very high anxiety levels are also contributing to this issue by ramping up her reflux. Discussed diet and lifestyle changes, including weight loss, in addition to taking her PPI therapy as it has been Rx'ed. Also discussed proper use of nasal steroid sprays. Stop azelastine.

## 2021-06-10 NOTE — PROGRESS NOTES
Assessment/Plan:        1. Encounter to establish care     2. Nausea  ondansetron (ZOFRAN) 4 MG tablet   3. Hypothyroidism, unspecified type  Thyroid Stimulating Hormone (TSH)   4. Essential hypertension  HM2(CBC w/o Differential)    Comprehensive Metabolic Panel    Urinalysis-UC if Indicated    triamterene-hydrochlorothiazide (MAXZIDE-25) 37.5-25 mg per tablet   5. Mixed hyperlipidemia  Lipid Profile    rosuvastatin (CRESTOR) 10 MG tablet   6. Age related osteoporosis, unspecified pathological fracture presence  Vitamin D, Total (25-Hydroxy)    DXA Bone Density Scan    denosumab 60 mg (PROLIA 60 mg/ml)   7. Personal history of other drug therapy  denosumab 60 mg (PROLIA 60 mg/ml)   8. Injury of left wrist, initial encounter  XR Wrist Left 3 or More VWS     Multiple issues discussed today.  1. Left wrist injury- xray per my review - no fractures. She will use compression or wrist splint and icing.  2. Osteoporosis - we got Prolia approved in 2018, but she never started the treatment. I will send request again. She will schedule DXA.  3. HTN -on Maxzide, she will check few more times over the next few weeks and report to me. She is very anxious when goes to doctors office.  4. Thyroid and other basic labs will be checked today.      This note has been dictated using voice recognition software. Any grammatical or context distortions are unintentional and inherent to the software.      Return in about 3 weeks (around 9/10/2020) for Annual physical.    Patient Instructions   Check BP few times a week and bring me the numbers on the next visit.  Ice and compression wrap on the left wrist.            Subjective:    Chanel Regan is a 74 y.o. female  here for    Chief Complaint   Patient presents with     Blood Pressure Check     B/P Issues-When she checks at the store she notices it is higher than normal, May need a higher dose of med.     75 yo female with h/o cryptogenic pneumonia in 2017 and prednisone therapy for  1 year, osteoporosis, HTN, hypothyroidism, hyperlipidemia is here to establish care and discuss needed blood work.  She fell 2 weeks ago and injured left wrist. Still has some swelling, but no pain with movements.  Social History     Socioeconomic History     Marital status:      Spouse name: Not on file     Number of children: Not on file     Years of education: Not on file     Highest education level: Not on file   Occupational History     Not on file   Social Needs     Financial resource strain: Not on file     Food insecurity     Worry: Not on file     Inability: Not on file     Transportation needs     Medical: Not on file     Non-medical: Not on file   Tobacco Use     Smoking status: Never Smoker     Smokeless tobacco: Never Used   Substance and Sexual Activity     Alcohol use: No     Drug use: Not on file     Sexual activity: Yes     Partners: Male     Birth control/protection: Post-menopausal   Lifestyle     Physical activity     Days per week: Not on file     Minutes per session: Not on file     Stress: Not on file   Relationships     Social connections     Talks on phone: Not on file     Gets together: Not on file     Attends [employer] service: Not on file     Active member of club or organization: Not on file     Attends meetings of clubs or organizations: Not on file     Relationship status: Not on file     Intimate partner violence     Fear of current or ex partner: Not on file     Emotionally abused: Not on file     Physically abused: Not on file     Forced sexual activity: Not on file   Other Topics Concern     Not on file   Social History Narrative     Not on file       Family History   Problem Relation Age of Onset     Breast cancer Sister      Osteoporosis Sister      Lung cancer Sister      COPD Sister      Review of Systems:     A 12 point comprehensive review of systems was negative except as noted in HPI.            Objective:    Physical Exam   /70   Pulse 94   Wt 150 lb 8 oz  (68.3 kg)   SpO2 98%   BMI 28.44 kg/m      Constitutional: oriented to person, place, and time, appears well-nourished. No distress.   HENT:   Head: Normocephalic.   Mouth/Throat: Oropharynx is clear and moist.   Eyes: Conjunctivae are normal. Pupils are equal, round, and reactive to light.   Neck: Normal range of motion. Neck supple.   Cardiovascular: Normal rate, regular rhythm and normal heart sounds.    Pulmonary/Chest: Effort normal and breath sounds normal.  Abdominal: Soft. Bowel sounds are normal.   Musculoskeletal: Normal range of motion in left wrist, only some soft tissues swelling, no bruises.   Neurological: alert and oriented to person, place, and time.  Psychiatric:  normal mood and affect.    Patient Active Problem List   Diagnosis     Allergic Rhinitis     Anxiety     GERD (gastroesophageal reflux disease)     Osteoporosis     Hypothyroidism     Vitamin D Deficiency     Hyperlipidemia     Hypertension       Current Outpatient Medications on File Prior to Visit   Medication Sig Dispense Refill     cholecalciferol, vitamin D3, 1,000 unit tablet Take 5,000 Units by mouth daily.        levothyroxine (SYNTHROID, LEVOTHROID) 75 MCG tablet Take 1 tablet (75 mcg total) by mouth daily. As directed 90 tablet 3     omeprazole (PRILOSEC) 20 MG capsule Take 1 capsule (20 mg total) by mouth 2 (two) times a day before meals. 180 capsule 3     [DISCONTINUED] ondansetron (ZOFRAN) 4 MG tablet TAKE ONE TABLET BY MOUTH DAILY AS NEEDED FOR NAUSEA 30 tablet 1     [DISCONTINUED] rosuvastatin (CRESTOR) 10 MG tablet Take 1 tablet (10 mg total) by mouth at bedtime. 90 tablet 3     [DISCONTINUED] triamterene-hydrochlorothiazide (MAXZIDE-25) 37.5-25 mg per tablet Take 1 tablet by mouth daily. 90 tablet 3     azelastine (ASTELIN) 137 mcg (0.1 %) nasal spray 1 spray into each nostril 2 (two) times a day. Use in each nostril as directed 30 mL 12     No current facility-administered medications on file prior to visit.                 Rusty Mcgowan  8/20/2020

## 2021-06-10 NOTE — PROGRESS NOTES
CCx: follow up lung process    HPI: Ms. Regan is a 71 year old female who we follow for a steroid responsive lung process that behaved like cryptogenic organizing pneumonia or non-specific interstitial pneumonitis (NSIP).  She was hypoxic on high dose steroids a few times last year, but finally tapered off prednisone at the end of last year.  Today she feels fine.  She is not short of breath.  She has a tickle in her throat she thinks from post nasal drip.  She thinks her nose constantly drips a clear fluid.  She ifrah like to see ENT.  The nasal meds I have given her didn't work.  She continues to exercise her lungs with an incentive spirometer and can raise the float to 3000.  She is no longer blowing up balloons as she was told they have latex...  She thinks her abdominal process is coming back as she has abdominal pains that come and go.    ROS:  Review of Systems - History obtained from the patient  General ROS: negative  Psychological ROS: negative  ENT ROS: constant dripping nose  Allergy and Immunology ROS: negative  Endocrine ROS: negative  Respiratory ROS:   negative for - cough, hemoptysis, orthopnea or shortness of breath  Cardiovascular ROS: no chest pain or palpitations  Gastrointestinal ROS: crampy abdominal pain coming back, intermittent and migratory  Genito-Urinary ROS: no dysuria, trouble voiding, or hematuria  Musculoskeletal ROS: complains of puffiness  Neurological ROS: no TIA or stroke symptoms  Dermatological ROS: negative      Current Meds:  Current Outpatient Prescriptions   Medication Sig     azelastine (ASTELIN) 137 mcg (0.1 %) nasal spray 1 spray into each nostril 2 (two) times a day. Use in each nostril as directed     cholecalciferol, vitamin D3, 1,000 unit tablet Take 5,000 Units by mouth daily.      Lactobacillus rhamnosus GG (CULTURELLE) 10-15 Billion cell capsule Take 1 capsule by mouth daily.     lactulose 10 gram/15 mL solution Take 15ml twice daily as needed for constipation      levothyroxine (SYNTHROID, LEVOTHROID) 75 MCG tablet TAKE 1 TABLET BY MOUTH DAILY AS DIRECTED     lisinopril (PRINIVIL,ZESTRIL) 10 MG tablet Take 1 tablet (10 mg total) by mouth daily.     omeprazole (PRILOSEC) 20 MG capsule TAKE 1 CAPSULE BY MOUTH TWICE DAILY     rosuvastatin (CRESTOR) 10 MG tablet Take 1 tablet (10 mg total) by mouth bedtime.     VENTOLIN HFA 90 mcg/actuation inhaler Inhale 2 puffs every 6 (six) hours as needed for wheezing.     vitamin E 400 UNIT capsule Take 400 Units by mouth daily.       Labs:  No results found for this or any previous visit (from the past 72 hour(s)).    I have personally reviewed all pertinent imaging studies and PFT results unless otherwise noted.    Imaging studies:  Ct Chest Without Contrast    Result Date: 1/18/2017  CT CHEST WO CONTRAST 1/17/2017 9:53 AM INDICATION: follow up  TECHNIQUE: Routine chest. Dose reduction techniques were used. IV CONTRAST: None. COMPARISON: CT of the chest without contrast 8/31/2016 and 6/6/2016. CTA chest 03/24/2016. FINDINGS: LUNGS AND PLEURA: Reticular interstitial thickening of all pulmonary lobes of both lungs affecting the lower lobes to the greatest degree has moderately improved compared to 8/31/2016. Patchy groundglass alveolar infiltrates persist in the regions of involvement little changed from prior. As before, there is cylindrical bronchiectasis of the lower lobes with minimal mucus plugging. No new areas of consolidation or pleural effusion. MEDIASTINUM: Several small mediastinal and hilar lymph nodes are unchanged and probably reactive. Heart size is normal. LIMITED UPPER ABDOMEN: Cholelithiasis. MUSCULOSKELETAL: Negative.     CONCLUSION: 1.  Moderate improvement in chronic alveolar and interstitial disease with lower lobe predominance which may be inflammatory or infectious. 2.  Cholelithiasis.        Physical Exam:  /90  Pulse 97  Resp 18  Wt 144 lb (65.3 kg)  SpO2 97% Comment: RA  BMI 27.21 kg/m2  General -  Well nourished  Ears/Mouth - TMs clear bilaterally,  OP pink moist, no thrush - nasal passages are boggy and reddened  Neck - no cervical lymphadenopathy  Lungs - Clear to ausculation bilaterally  CVS - regular rhythm with no murmurs, rubs or gallups  Abdomen - soft, NT, ND, NABS  Ext - no cyanosis, clubbing or edema  Skin - no rash  Psychology - alert and oriented, answers appropriate      Assessment and Plan:Chanel Regan is a 71 y.o. 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  She has been symptom free for many months now and has been off steroids all year with no issue.  I think we can follow her from a distance, symptoms will be the guide if this starts to return.    1) NSIP - seems to be quiescent.  If she starts to develop dyspnea she should return to the clinic and have a Chest CT and full PFTs again.  We discussed performing a biopsy if this returns, but it hasn't.  2) Allergic rhinitis - this has persisted despite nasal steroids, nasal antihistamine and nasal hygiene maneuvers with sinu-med.  Refer to ENT for their opinion.  3) Return PRN         Electronically signed by:    Alvaro Burnett MD PhD  [facility]

## 2021-06-10 NOTE — PATIENT INSTRUCTIONS - HE
Check BP few times a week and bring me the numbers on the next visit.  Ice and compression wrap on the left wrist.

## 2021-06-11 NOTE — PROGRESS NOTES
Assessment and Plan:       1. Routine general medical examination at a health care facility  Labs reviewed.  Component      Latest Ref Rng & Units 8/20/2020   Sodium      136 - 145 mmol/L 143   Potassium      3.5 - 5.0 mmol/L 3.7   Chloride      98 - 107 mmol/L 104   CO2      22 - 31 mmol/L 27   Anion Gap, Calculation      5 - 18 mmol/L 12   Glucose      70 - 125 mg/dL 98   BUN      8 - 28 mg/dL 13   Creatinine      0.60 - 1.10 mg/dL 0.89   GFR MDRD Af Amer      >60 mL/min/1.73m2 >60   GFR MDRD Non Af Amer      >60 mL/min/1.73m2 >60   Bilirubin, Total      0.0 - 1.0 mg/dL 0.6   Calcium      8.5 - 10.5 mg/dL 10.4   Protein, Total      6.0 - 8.0 g/dL 7.8   ALBUMIN      3.5 - 5.0 g/dL 4.2   Alkaline Phosphatase      45 - 120 U/L 58   AST      0 - 40 U/L 23   ALT      0 - 45 U/L 27   Color, UA      Colorless, Yellow, Straw, Light Yellow Yellow   Clarity, UA      Clear Clear   Glucose, UA      Negative Negative   Bilirubin, UA      Negative Negative   Ketones, UA      Negative Negative   Specific Gravity, UA      1.005 - 1.030 1.020   Blood, UA      Negative Negative   pH, UA      5.0 - 8.0 7.0   Protein, UA      Negative mg/dL Negative   Urobilinogen, UA      0.2 E.U./dL, 1.0 E.U./dL 0.2 E.U./dL   Nitrite, UA      Negative Negative   Leukocytes, UA      Negative Negative   WBC      4.0 - 11.0 thou/uL 7.3   RBC      3.80 - 5.40 mill/uL 5.06   Hemoglobin      12.0 - 16.0 g/dL 15.3   Hematocrit      35.0 - 47.0 % 45.6   MCV      80 - 100 fL 90   MCH      27.0 - 34.0 pg 30.2   MCHC      32.0 - 36.0 g/dL 33.5   RDW      11.0 - 14.5 % 12.3   Platelets      140 - 440 thou/uL 192   MPV      7.0 - 10.0 fL 8.3   Triglycerides      <=149 mg/dL 237 (H)   Cholesterol      <=199 mg/dL 165   LDL Calculated      <=129 mg/dL 72   HDL Cholesterol      >=50 mg/dL 46 (L)   Patient Fasting > 8hrs?       Yes   TSH      0.30 - 5.00 uIU/mL 2.60   Vitamin D, Total (25-Hydroxy)      30.0 - 80.0 ng/mL 80.4 (H)       2. Colon cancer  screening    - Ambulatory referral for Colonoscopy    3. Age related osteoporosis, unspecified pathological fracture presence  DXA was done today. T-score in right hip -2.5. There is some improvement comparing to DXA in 2016. She refused any active treatment at this point.    4. Hypothyroidism, unspecified type  On levothyroxine.    5. Essential hypertension  Stable.  - triamterene-hydrochlorothiazide (MAXZIDE-25) 37.5-25 mg per tablet; Take 1 tablet by mouth daily.  Dispense: 90 tablet; Refill: 3    6. Mixed hyperlipidemia  Stable.  - rosuvastatin (CRESTOR) 10 MG tablet; Take 1 tablet (10 mg total) by mouth at bedtime.  Dispense: 90 tablet; Refill: 3    7. Vasomotor rhinitis    - azelastine (ASTELIN) 137 mcg (0.1 %) nasal spray; 1 spray into each nostril 2 (two) times a day. Use in each nostril as directed  Dispense: 30 mL; Refill: 12    8. Hypothyroid    - levothyroxine (SYNTHROID, LEVOTHROID) 75 MCG tablet; Take 1 tablet (75 mcg total) by mouth daily. As directed  Dispense: 90 tablet; Refill: 3    9. GERD (gastroesophageal reflux disease)    - omeprazole (PRILOSEC) 20 MG capsule; Take 1 capsule (20 mg total) by mouth 2 (two) times a day before meals.  Dispense: 180 capsule; Refill: 3    10. Nausea    - ondansetron (ZOFRAN) 4 MG tablet; TAKE ONE TABLET BY MOUTH DAILY AS NEEDED FOR NAUSEA  Dispense: 30 tablet; Refill: 1    11. Need for influenza vaccination    - Influenza,Quad,High Dose,PF 65 YR+     The patient's current medical problems were reviewed.    I have had an Advance Directives discussion with the patient.  The following health maintenance schedule was reviewed with the patient and provided in printed form in the after visit summary:   Health Maintenance   Topic Date Due     ZOSTER VACCINES (1 of 2) 09/20/1995     DXA SCAN  12/22/2018     TD 18+ HE  06/22/2020     INFLUENZA VACCINE RULE BASED (1) 08/01/2020     MEDICARE ANNUAL WELLNESS VISIT  01/20/2021     FALL RISK ASSESSMENT  01/20/2021     MAMMOGRAM   02/07/2022     ADVANCE CARE PLANNING  01/20/2025     LIPID  08/20/2025     COLORECTAL CANCER SCREENING  11/04/2025     HEPATITIS C SCREENING  Completed     PNEUMOCOCCAL IMMUNIZATION 65+ LOW/MEDIUM RISK  Completed     HEPATITIS B VACCINES  Aged Out        Subjective:   Chief Complaint: Chanel Regan is an 74 y.o. female here for an Annual Wellness visit.   HPI:  Acute and chronic medical problems discussed as above.    Review of Systems:    Please see above.  The rest of the review of systems are negative for all systems.    Patient Care Team:  Rsuty Mcgowan MD as PCP - General (Internal Medicine)  Emili Martinez NP as Assigned PCP     Patient Active Problem List   Diagnosis     Allergic Rhinitis     Anxiety     GERD (gastroesophageal reflux disease)     Osteoporosis     Hypothyroidism     Vitamin D Deficiency     Hyperlipidemia     Hypertension     Past Medical History:   Diagnosis Date     Anxiety      GERD (gastroesophageal reflux disease)      Hemorrhoids      Hyperlipidemia      Hypertension      Hypothyroidism      Osteoporosis      Pneumonia       Past Surgical History:   Procedure Laterality Date     NO PAST SURGERIES        Family History   Problem Relation Age of Onset     Breast cancer Sister      Osteoporosis Sister      Lung cancer Sister      COPD Sister       Social History     Socioeconomic History     Marital status:      Spouse name: Not on file     Number of children: Not on file     Years of education: Not on file     Highest education level: Not on file   Occupational History     Not on file   Social Needs     Financial resource strain: Not on file     Food insecurity     Worry: Not on file     Inability: Not on file     Transportation needs     Medical: Not on file     Non-medical: Not on file   Tobacco Use     Smoking status: Never Smoker     Smokeless tobacco: Never Used   Substance and Sexual Activity     Alcohol use: No     Drug use: Not on file     Sexual activity: Yes      "Partners: Male     Birth control/protection: Post-menopausal   Lifestyle     Physical activity     Days per week: Not on file     Minutes per session: Not on file     Stress: Not on file   Relationships     Social connections     Talks on phone: Not on file     Gets together: Not on file     Attends Protestant service: Not on file     Active member of club or organization: Not on file     Attends meetings of clubs or organizations: Not on file     Relationship status: Not on file     Intimate partner violence     Fear of current or ex partner: Not on file     Emotionally abused: Not on file     Physically abused: Not on file     Forced sexual activity: Not on file   Other Topics Concern     Not on file   Social History Narrative     Not on file      Current Outpatient Medications   Medication Sig Dispense Refill     levothyroxine (SYNTHROID, LEVOTHROID) 75 MCG tablet Take 1 tablet (75 mcg total) by mouth daily. As directed 90 tablet 3     omeprazole (PRILOSEC) 20 MG capsule Take 1 capsule (20 mg total) by mouth 2 (two) times a day before meals. 180 capsule 3     ondansetron (ZOFRAN) 4 MG tablet TAKE ONE TABLET BY MOUTH DAILY AS NEEDED FOR NAUSEA 30 tablet 1     rosuvastatin (CRESTOR) 10 MG tablet Take 1 tablet (10 mg total) by mouth at bedtime. 90 tablet 3     triamterene-hydrochlorothiazide (MAXZIDE-25) 37.5-25 mg per tablet Take 1 tablet by mouth daily. 90 tablet 3     azelastine (ASTELIN) 137 mcg (0.1 %) nasal spray 1 spray into each nostril 2 (two) times a day. Use in each nostril as directed 30 mL 12     cholecalciferol, vitamin D3, 1,000 unit tablet Take 5,000 Units by mouth daily.        Current Facility-Administered Medications   Medication Dose Route Frequency Provider Last Rate Last Dose     denosumab 60 mg (PROLIA 60 mg/ml)  60 mg Subcutaneous Q6 Months Rusty Mcgowan MD          Objective:   Vital Signs:   Visit Vitals  /73   Pulse 95   Ht 5' 1\" (1.549 m)   Wt 149 lb 6.4 oz (67.8 kg)   SpO2 99% "   BMI 28.23 kg/m           VisionScreening:  No exam data present     PHYSICAL EXAM  General Appearance: Alert, cooperative, no distress, appears stated age.  Head: Normocephalic, without obvious abnormality, atraumatic  Eyes: PERRL, conjunctiva/corneas clear, EOM's intact  Ears: Normal TM's and external ear canals, both ears  Nose: Nares normal, septum midline,mucosa normal, no drainage  Throat: Lips, mucosa, and tongue normal; teeth and gums normal  Neck: Supple, symmetrical, trachea midline, no adenopathy;  thyroid: not enlarged, symmetric, no tenderness/mass/nodules; no carotid bruit or JVD  Back: Symmetric, no curvature, ROM normal, no CVA tenderness.  Lungs: Clear to auscultation bilaterally, respirations unlabored.  Breasts: No breast masses, tenderness, asymmetry, or nipple discharge.  Heart: Regular rate and rhythm, S1 and S2 normal, no murmur, rub, or gallop.  Abdomen: Soft, non-tender, bowel sounds active all four quadrants,  no masses, no organomegaly.  Pelvic:declined  Extremities: Extremities normal, atraumatic, no cyanosis or edema.  Skin: Skin color, texture, turgor normal, no rashes or lesions.  Lymph nodes: Cervical, supraclavicular, and axillary nodes normal.  Neurologic: No focal neurological findings.      Assessment Results 9/10/2020   Activities of Daily Living No help needed   Instrumental Activities of Daily Living No help needed   Mini Cog Total Score 3   Some recent data might be hidden     A Mini-Cog score of 0-2 suggests the possibility of dementia, score of 3-5 suggests no dementia      Identified Health Risks:     She is at risk for lack of exercise and has been provided with information to increase physical activity for the benefit of her well-being.  The patient was counseled and encouraged to consider modifying their diet and eating habits. She was provided with information on recommended healthy diet options.  The patient was provided with written information regarding signs of  hearing loss.  Information on urinary incontinence and treatment options given to patient.  She is at risk for falling and has been provided with information to reduce the risk of falling at home.  Patient's advanced directive was discussed and I am comfortable with the patient's wishes.

## 2021-06-11 NOTE — PROGRESS NOTES
Dear  ,  Your patient, Chanel Regan was seen today for management of osteoporosis.  The last bone density scan was done on 12/21/16:  1. The spine bone density L1-L4 with T-score -1.2, stable compared to 2011.  2. Femoral bone densities show left femoral neck T- score -2.4 and right femoral neck T-score -2.7 and significant decline of 6.9% on the right hip compared to 2011.  3. Trabecular bone score indicates moderate trabecular bone architecture.  Patient was treated in the past with Fosamax for few months only . 10 years ago and had GI disturbance and headaches.    Social history:  reports that she has never smoked. She has never used smokeless tobacco. She reports that she does not drink alcohol.    Past medical history:   Patient Active Problem List   Diagnosis     Allergic Rhinitis     Anxiety     GERD (gastroesophageal reflux disease)     Osteoporosis     Hypothyroidism     Vitamin D Deficiency     Hyperlipidemia     Hypertension     Respiratory failure with hypoxia     History of fractures: none     FH: unclear if her sister and mother have OP  Family History   Problem Relation Age of Onset     Breast cancer Sister      Cancer Sister      Osteoporosis Sister      No Medical Problems Mother      No Medical Problems Father      No Medical Problems Daughter      No Medical Problems Maternal Grandmother      No Medical Problems Maternal Grandfather      No Medical Problems Paternal Grandmother      No Medical Problems Paternal Grandfather      No Medical Problems Maternal Aunt      No Medical Problems Paternal Aunt      BRCA 1/2 Neg Hx      Colon cancer Neg Hx      Endometrial cancer Neg Hx      Ovarian cancer Neg Hx        Diet and supplements: vit D 5000 IU daily, MVI    Risk medications: levothyroxine. She did take prednisone > 5mg daily for 1 year, last year, for steroid responsive lung process that behaved like cryptogenic organizing pneumonia or non-specific interstitial pneumonitis (NSIP).       Gynecologic history: menopause age 55, no HRT    Laboratory testing:   Component      Latest Ref Rng & Units 11/9/2016           1:43 PM   Sodium      136 - 145 mmol/L    Potassium      3.5 - 5.0 mmol/L    Chloride      98 - 107 mmol/L    CO2      22 - 31 mmol/L    Anion Gap, Calculation      5 - 18 mmol/L    Glucose      70 - 125 mg/dL    BUN      8 - 28 mg/dL    Creatinine      0.60 - 1.10 mg/dL    GFR MDRD Af Amer      >60 mL/min/1.73m2    GFR MDRD Non Af Amer      >60 mL/min/1.73m2    Bilirubin, Total      0.0 - 1.0 mg/dL    Calcium      8.5 - 10.5 mg/dL    Protein, Total      6.0 - 8.0 g/dL 7.8   ALBUMIN      3.5 - 5.0 g/dL    Alkaline Phosphatase      45 - 120 U/L    AST      0 - 40 U/L    ALT      0 - 45 U/L    Albumin %      51.0 - 67.0 % 56.8   Albumin       3.2 - 4.7 g/dL 4.4   Alpha 1 %      2.0 - 4.0 % 2.9   Alpha 1      0.1 - 0.3 g/dL 0.2   Alpha 2 %      5.0 - 13.0 % 11.9   Alpha 2      0.4 - 0.9 g/dL 0.9   % Beta      10.0 - 17.0 % 12.4   Beta      0.7 - 1.2 g/dL 1.0   Gamma Globulin %      9.0 - 20.0 % 16.0   Gamma Globulin      0.6 - 1.4 g/dL 1.2   ELP Comment       Unremarkable protein electrophoresis.   Path ICD:       R77.9   Interpreted By:       Tej Miranda MD   Cholesterol      <=199 mg/dL    Triglycerides      <=149 mg/dL    HDL Cholesterol      >=50 mg/dL    LDL Calculated      <=129 mg/dL    Patient Fasting > 8hrs?          Immunofixation Electrophoresis, Serum          Vitamin D, Total (25-Hydroxy)      30.0 - 80.0 ng/mL 55.4   PTH      10 - 86 pg/mL    Hgb      7.0 g/dL    CK, Total      30 - 190 U/L      Component      Latest Ref Rng & Units 11/9/2016           1:43 PM   Sodium      136 - 145 mmol/L    Potassium      3.5 - 5.0 mmol/L    Chloride      98 - 107 mmol/L    CO2      22 - 31 mmol/L    Anion Gap, Calculation      5 - 18 mmol/L    Glucose      70 - 125 mg/dL    BUN      8 - 28 mg/dL    Creatinine      0.60 - 1.10 mg/dL    GFR MDRD Af Amer      >60 mL/min/1.73m2    GFR  MDRD Non Af Amer      >60 mL/min/1.73m2    Bilirubin, Total      0.0 - 1.0 mg/dL    Calcium      8.5 - 10.5 mg/dL    Protein, Total      6.0 - 8.0 g/dL    ALBUMIN      3.5 - 5.0 g/dL    Alkaline Phosphatase      45 - 120 U/L    AST      0 - 40 U/L    ALT      0 - 45 U/L    Albumin %      51.0 - 67.0 %    Albumin       3.2 - 4.7 g/dL    Alpha 1 %      2.0 - 4.0 %    Alpha 1      0.1 - 0.3 g/dL    Alpha 2 %      5.0 - 13.0 %    Alpha 2      0.4 - 0.9 g/dL    % Beta      10.0 - 17.0 %    Beta      0.7 - 1.2 g/dL    Gamma Globulin %      9.0 - 20.0 %    Gamma Globulin      0.6 - 1.4 g/dL    ELP Comment          Path ICD:       R77.9   Interpreted By:       Tej Miranda MD   Cholesterol      <=199 mg/dL    Triglycerides      <=149 mg/dL    HDL Cholesterol      >=50 mg/dL    LDL Calculated      <=129 mg/dL    Patient Fasting > 8hrs?          Immunofixation Electrophoresis, Serum       Unremarkable immunofixation electrophoresis.   Vitamin D, Total (25-Hydroxy)      30.0 - 80.0 ng/mL    PTH      10 - 86 pg/mL    Hgb      7.0 g/dL    CK, Total      30 - 190 U/L      Component      Latest Ref Rng & Units 12/14/2016 1/17/2017 2/28/2017               Sodium      136 - 145 mmol/L   143   Potassium      3.5 - 5.0 mmol/L   5.3 (H)   Chloride      98 - 107 mmol/L   104   CO2      22 - 31 mmol/L   28   Anion Gap, Calculation      5 - 18 mmol/L   11   Glucose      70 - 125 mg/dL   86   BUN      8 - 28 mg/dL   16   Creatinine      0.60 - 1.10 mg/dL   0.79   GFR MDRD Af Amer      >60 mL/min/1.73m2   >60   GFR MDRD Non Af Amer      >60 mL/min/1.73m2   >60   Bilirubin, Total      0.0 - 1.0 mg/dL   0.8   Calcium      8.5 - 10.5 mg/dL   11.4 (H)   Protein, Total      6.0 - 8.0 g/dL   7.8   ALBUMIN      3.5 - 5.0 g/dL   4.1   Alkaline Phosphatase      45 - 120 U/L   70   AST      0 - 40 U/L   19   ALT      0 - 45 U/L   18   Albumin %      51.0 - 67.0 %      Albumin       3.2 - 4.7 g/dL      Alpha 1 %      2.0 - 4.0 %      Alpha 1       0.1 - 0.3 g/dL      Alpha 2 %      5.0 - 13.0 %      Alpha 2      0.4 - 0.9 g/dL      % Beta      10.0 - 17.0 %      Beta      0.7 - 1.2 g/dL      Gamma Globulin %      9.0 - 20.0 %      Gamma Globulin      0.6 - 1.4 g/dL      ELP Comment            Path ICD:            Interpreted By:            Cholesterol      <=199 mg/dL   178   Triglycerides      <=149 mg/dL   272 (H)   HDL Cholesterol      >=50 mg/dL   44 (L)   LDL Calculated      <=129 mg/dL   80   Patient Fasting > 8hrs?         Yes   Immunofixation Electrophoresis, Serum            Vitamin D, Total (25-Hydroxy)      30.0 - 80.0 ng/mL      PTH      10 - 86 pg/mL 37  18   Hgb      7.0 g/dL  13.8    CK, Total      30 - 190 U/L   55       ROS:     Constitutional: Negative.    HENT: Negative.    Eyes: Negative.    Respiratory: Negative.    Cardiovascular: Negative.    Gastrointestinal: Negative.    Endocrine: Negative.    Genitourinary: Negative.    Musculoskeletal: Negative.    Skin: Negative.    Allergic/Immunologic: Negative.    Neurological: Negative.    Hematological: Negative.    Psychiatric/Behavioral: Negative.      PE:  /90  Pulse 82  Wt 149 lb (67.6 kg)  BMI 28.15 kg/m2  Constitutional:  oriented to person, place, and time, appears well-nourished. No distress.           Impression:   1. Osteoporosis  Comprehensive Metabolic Panel    Calcium, Ionized, Measured    Celiac(Gluten)Antibody Panel ($$$)    Thyroid Stimulating Hormone (TSH)    Magnesium    Vitamin D, Total (25-Hydroxy)   2. Hypothyroidism  Comprehensive Metabolic Panel    Calcium, Ionized, Measured    Celiac(Gluten)Antibody Panel ($$$)    Thyroid Stimulating Hormone (TSH)       Plan:  1. The patient will take 1200 - 1500 mg of calcium daily from the diet and supplements.  2. The patient will take 2000 IU of vit D daily.I will recheck labs for celiac panel, calcium, tsh.  3. Treatment options discussed with the patient and we will wait for Prolia approval from her insurance.  4. I am  asking for follow up in 1 year with DXA scan .    Patient Instructions   Prolia 1st today.  Prolia 2nd in 6 months.  DXA in 1 year .   Phone number to schedule [phone].  Please avoid any extensive dental work as implants and teeth extractions for the next 1-2 months.  Daily calcium need is 3188-6481 mg a day from the diet and supplements.  Calcium citrate is easier to digest.  Vitamin D 2000 IU daily recommended.    25 minutes spent with the patient, more than 50% of the time in counseling.    Thank you for the opportunity to participate in the care of your patient. If you have any additional questions, do not hesitate to contact me at [facility].    This note has been dictated using voice recognition software. Any grammatical or context distortions are unintentional and inherent to the software      With best personal regards,   Rusty Mcgowan MD, CCD  7/13/2017

## 2021-06-12 NOTE — TELEPHONE ENCOUNTER
Who is calling:  Patient   Reason for Call:  Patient states she had office visit on 09/10/20 provider documented as Annual Wellness visit which she had physicals on 01/20/20 her insurance will not cover for visit on 09/10/20 for physicals , patient is requesting to change office visit type on 09/10/20. For questions please reach out patient .  Date of last appointment with primary care: 09/10/20  Okay to leave a detailed message: No

## 2021-06-14 NOTE — PROGRESS NOTES
Can you please contact Chanel and have her schedule a 2 week bp check w/ me please?  She's off of lisinopril to see if that helps her cough and also her BP was up.  You can use a reserved if needed.  Thank you!

## 2021-06-16 NOTE — TELEPHONE ENCOUNTER
Refill Request  Medication name:   Requested Prescriptions     Pending Prescriptions Disp Refills     levothyroxine (SYNTHROID, LEVOTHROID) 75 MCG tablet [Pharmacy Med Name: LEVOTHYROXINE 75 MCG TABLET] 90 tablet 3     Sig: TAKE 1 TABLET BY MOUTH EVERY DAY AS DIRECTED     Who prescribed the medication: Rusty Mcgowan MD  Last refill on medication: 09/10/2020 - 1 yr supply.  Requested Pharmacy: [facility]  Last appointment with PCP: 09/10/2020  Next appointment: Not due

## 2021-06-16 NOTE — TELEPHONE ENCOUNTER
Telephone Encounter by Мария Huitron at 3/19/2020  2:25 PM     Author: Мария Huitron Service: -- Author Type: --    Filed: 3/19/2020  2:26 PM Encounter Date: 3/13/2020 Status: Signed    : Мария Huitron       PRIOR AUTHORIZATION DENIED    Denial Rational: per call to [facility] patient must try/fail omeprazole 40mg once daily           Appeal Information: This medication was denied. If physician would like to appeal because patient has contraindication or allergy to covered medication please write letter of medical necessity and route back to PA team to initiate.  If no further action is needed please close encounter thank you.
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
## 2021-06-17 NOTE — PROGRESS NOTES
Clinic Note    Assessment:     Assessment and Plan:  1. Essential hypertension  Blood pressure looks great today.  She has noticed slightly decreased swelling in her arms.  Her weight is down 2 pounds.  No side effects from medications.  Plan is to recheck a BMP today.  I would also like her to return in 3 weeks so that we can check a fasting lipid panel.  - Basic Metabolic Panel     Patient Instructions   We will re-check your lab work today. We will call you when the results come back in 1-2 days.     Lets have you continue taking the Chlorthalidone as prescribed.     Start walking for ten minutes three times per day to help with the gas pains.     Come back to see me in three weeks so that we can re-check some some fasting labs.              Subjective:      Patient presents to clinic today for follow-up of her blood pressure, weights, and lab work.    Patient was last seen by me about 2 weeks ago.  At that time she had an elevated blood pressure of 148/86.  She also had some   Problematic swelling in her arms.    We increased her chlorthalidone to 50 mg daily and had her follow-up with me in 2 weeks.    Her blood pressure today is 134/80.  Her weight is down about 2 pounds. She says that she is not having any side effects form the medication. No increased urination per se. She is not having any pain in her abdomen.    Patient wonders about her cholesterol lab work.  She was last seen by her PCP in March.    Lipid Cascade at that time showed cholesterol 270, triglycerides of 393, and LDL of 146.  Patient was started on Crestor 20 mg at that time.    The following portions of the patient's history were reviewed and updated as appropriate: Allergies, medications, problems, prior note.    Review of Systems:    Review is negative except for what is mentioned above.     Social Hx:    History   Smoking Status     Never Smoker   Smokeless Tobacco     Never Used         Objective:     Vitals:    04/25/18 0916   BP:  134/80   Pulse: 90   SpO2: 99%   Weight: 145 lb 1.6 oz (65.8 kg)       Exam:    General: No apparent distress. Calm. Alert and Oriented X3. Pt behavior is appropriate.  Head:Atraumatic. Normocephalic, non-tender to palpation  Chest/Lungs: Normal chest wall, clear to auscultation, normal respiratory effort and rate.   Heart/Pulses: Regular rate and rhythm, strong and equal radial pulses, no murmurs. Capillary refill <2 seconds. No edema.   Musculoskeletal: No CVA tenderness with palpation. Good ROM with extremities.   Neurologic: Interactive, alert, no focal findings, CNs intact.   Skin: Warm, dry. Normal hair pattern. Free of lesions. Normal skin turgor.       Patient Active Problem List   Diagnosis     Allergic Rhinitis     Anxiety     GERD (gastroesophageal reflux disease)     Osteoporosis     Hypothyroidism     Vitamin D Deficiency     Hyperlipidemia     Hypertension     Respiratory failure with hypoxia     Current Outpatient Prescriptions   Medication Sig Dispense Refill     azelastine (ASTELIN) 137 mcg (0.1 %) nasal spray 1 spray into each nostril 2 (two) times a day. Use in each nostril as directed 30 mL 12     chlorthalidone (HYGROTEN) 50 MG tablet Take 1 tablet (50 mg total) by mouth daily. 90 tablet 1     cholecalciferol, vitamin D3, 1,000 unit tablet Take 5,000 Units by mouth daily.        lactulose 10 gram/15 mL solution Take 15ml twice daily as needed for constipation 240 mL 0     levothyroxine (SYNTHROID, LEVOTHROID) 75 MCG tablet TAKE 1 TABLET BY MOUTH DAILY AS DIRECTED 90 tablet 2     omeprazole (PRILOSEC) 20 MG capsule TAKE 1 CAPSULE BY MOUTH TWICE DAILY 180 capsule 2     rosuvastatin (CRESTOR) 20 MG tablet TAKE 1 TABLET (20 MG TOTAL) BY MOUTH BEDTIME. 90 tablet 2     No current facility-administered medications for this visit.        Total time spent with patient was 15 minutes with >50% of time spent in face-to-face counseling regarding the above plan       Mio Tabares (Rob),  CNP    4/25/2018

## 2021-06-17 NOTE — PATIENT INSTRUCTIONS - HE
Patient Instructions by Rashel Lowry PA-C at 5/21/2019  4:00 PM     Author: Rashel Lowry PA-C Service: -- Author Type: Physician Assistant    Filed: 5/21/2019  4:41 PM Encounter Date: 5/21/2019 Status: Addendum    : Rashel Lowry PA-C (Physician Assistant)    Related Notes: Original Note by Rashel Lowry PA-C (Physician Assistant) filed at 5/21/2019  4:38 PM       Suggested increased rest increased fluids and bedside humidification  Over-the-counter Tylenol for comfort.  Follow packaging directions  Over-the-counter throat lozenges with benzocaine such as Cepacol may be used if indicated and is not a choking hazard based on age.  Follow packaging directions.  Do not overuse the benzocaine as it will dry the throat and make it uncomfortable.  Follow up with primary care provider if you do not get resolution with the course of treatment.  Return to walk-in care if complication or new symptoms arise in the interim.      Self-Care for Sore Throats  Sore throats happen for many reasons, such as colds, allergies, and infections caused by viruses or bacteria. In any case, your throat becomes red and sore. Your goal for self-care is to reduce your discomfort while giving your throat a chance to heal.    Moisten and soothe your throat  Tips include the following:    Try a sip of water first thing after waking up.    Keep your throat moist by drinking 6 or more glasses of clear liquids every day.    Run a cool-air humidifier in your room overnight.    Avoid cigarette smoke.     Suck on throat lozenges, cough drops, hard candy, ice chips, or frozen fruit-juice bars. Use the sugar-free versions if your diet or medical condition requires them.  Gargle to ease irritation  Gargling every hour or 2 can ease irritation. Try gargling with 1 of these solutions:    1/4 teaspoon of salt in 1/2 cup of warm water    An over-the-counter anesthetic gargle  Use medicine for more relief  Over-the-counter medicine can reduce sore  "throat symptoms. Ask your pharmacist if you have questions about which medicine to use:    Ease pain with anesthetic sprays. Aspirin or an aspirin substitute also helps. Remember, never give aspirin to anyone 18 or younger, or if you are already taking blood thinners.     For sore throats caused by allergies, try antihistamines to block the allergic reaction.    Remember: unless a sore throat is caused by a bacterial infection, antibiotics wont help you.  Prevent future sore throats  Prevention tips include the following:    Stop smoking or reduce contact with secondhand smoke. Smoke irritates the tender throat lining.    Limit contact with pets and with allergy-causing substances, such as pollen and mold.    When youre around someone with a sore throat or cold, wash your hands often to keep viruses or bacteria from spreading.    Dont strain your vocal cords.  Call your healthcare provider  Contact your healthcare provider if you have:    A temperature over 101 F (38.3 C)    White spots on the throat    Great difficulty swallowing    Trouble breathing    A skin rash    Recent exposure to someone else with strep bacteria    Severe hoarseness and swollen glands in the neck or jaw   Date Last Reviewed: 8/1/2016 2000-2016 [employer]. [address] [place] [address]. All rights reserved. This information is not intended as a substitute for professional medical care. Always follow your healthcare professional's instructions.          Patient Education     Viral Syndrome (Adult)  A viral illness may cause a number of symptoms. The symptoms depend on the part of the body that the virus affects. If it settles in your nose, throat, and lungs, it may cause cough, sore throat, congestion, and sometimes headache. If it settles in your stomach and intestinal tract, it may cause vomiting and diarrhea. Sometimes it causes vague symptoms like \"aching all over,\" feeling tired, loss of appetite, or " fever.  A viral illness usually lasts 1 to 2 weeks, but sometimes it lasts longer. In some cases, a more serious infection can look like a viral syndrome in the first few days of the illness. You may need another exam and additional tests to know the difference. Watch for the warning signs listed below.  Home care  Follow these guidelines for taking care of yourself at home:    If symptoms are severe, rest at home for the first 2 to 3 days.    Stay away from cigarette smoke - both your smoke and the smoke from others.    You may use over-the-counter acetaminophen or ibuprofen for fever, muscle aching, and headache, unless another medicine was prescribed for this. If you have chronic liver or kidney disease or ever had a stomach ulcer or GI bleeding, talk with your doctor before using these medicines. No one who is younger than 18 and ill with a fever should take aspirin. It may cause severe disease or death.    Your appetite may be poor, so a light diet is fine. Avoid dehydration by drinking 8 to 12 8-ounce glasses of fluids each day. This may include water; orange juice; lemonade; apple, grape, and cranberry juice; clear fruit drinks; electrolyte replacement and sports drinks; and decaffeinated teas and coffee. If you have been diagnosed with a kidney disease, ask your doctor how much and what types of fluids you should drink to prevent dehydration. If you have kidney disease, drinking too much fluid can cause it build up in the your body and be dangerous to your health.    Over-the-counter remedies won't shorten the length of the illness but may be helpful for cough, sore throat; and nasal and sinus congestion. Don't use decongestants if you have high blood pressure.  Follow-up care  Follow up with your healthcare provider if you do not improve over the next week.  Call 911  Call 911 if any of the following occur:    Convulsion    Feeling weak, dizzy, or like you are going to faint    Chest pain, shortness of  breath, wheezing, or difficulty breathing  When to seek medical advice  Call your healthcare provider right away if any of these occur:    Cough with lots of colored sputum (mucus) or blood in your sputum    Chest pain, shortness of breath, wheezing, or difficulty breathing    Severe headache; face, neck, or ear pain    Severe, constant pain in the lower right side of your belly (abdominal)    Continued vomiting (cant keep liquids down)    Frequent diarrhea (more than 5 times a day); blood (red or black color) or mucus in diarrhea    Feeling weak, dizzy, or like you are going to faint    Extreme thirst    Fever of 100.4 F (38 C) or higher, or as directed by your healthcare provider  Date Last Reviewed: 9/25/2015 2000-2017 [employer]. [address] [place] [address], [place] [address]. All rights reserved. This information is not intended as a substitute for professional medical care. Always follow your healthcare professional's instructions.

## 2021-06-17 NOTE — PATIENT INSTRUCTIONS - HE
Patient Instructions by Nora Randolph CNP at 3/11/2019 11:20 AM     Author: Nora Randolph CNP Service: -- Author Type: Nurse Practitioner    Filed: 3/11/2019 11:55 AM Encounter Date: 3/11/2019 Status: Addendum    : Nora Randolph CNP (Nurse Practitioner)    Related Notes: Original Note by Nora Randolph CNP (Nurse Practitioner) filed at 3/11/2019 11:54 AM       Ice your neck and arm today    Aleve 1 tab twice daily until better.      Warms packs starting tomorrow.     Avoid heavy lifting with left arm.        Patient Education     Muscle Strain in the Extremities  A muscle strain is a stretching and tearing of muscle fibers. This causes pain, especially when you move that muscle. There may also be some swelling and bruising.  Home care    Keep the hurt area raised to reduce pain and swelling. This is especially important during the first 48 hours.    Apply an ice pack over the injured area for 15 to 20 minutes every 3 to 6 hours. You should do this for the first 24 to 48 hours. You can make an ice pack by filling a plastic bag that seals at the top with ice cubes and then wrapping it with a thin towel. Be careful not to injure your skin with the ice treatments. Ice should never be applied directly to skin. Continue the use of ice packs for relief of pain and swelling as needed. After 48 hours, apply heat (warm shower or warm bath) for 15 to 20 minutes several times a day, or alternate ice and heat.    You may use over-the-counter pain medicine to control pain, unless another medicine was prescribed. If you have chronic liver or kidney disease or ever had a stomach ulcer or GI bleeding, talk with your healthcare provider before using these medicines.    For leg strains: If crutches have been recommended, dont put full weight on the hurt leg until you can do so without pain. You can return to sports when you are able to hop and run on the injured leg without pain.  Follow-up care  Follow up with  your healthcare provider, or as advised.  When to seek medical advice  Call your healthcare provider right away if any of these occur:    The toes of the injured leg become swollen, cold, blue, numb, or tingly    Pain or swelling increases  Date Last Reviewed: 11/19/2015 2000-2017 [employer]. [address] [place] [id number]. All rights reserved. This information is not intended as a substitute for professional medical care. Always follow your healthcare professional's instructions.

## 2021-06-17 NOTE — PATIENT INSTRUCTIONS - HE
Patient Instructions by Emili Martinez NP at 1/20/2020  1:00 PM     Author: Emili Martinez NP Service: -- Author Type: Nurse Practitioner    Filed: 1/20/2020  1:35 PM Encounter Date: 1/20/2020 Status: Signed    : Emili Martinez NP (Nurse Practitioner)         Patient Education     Exercise for a Healthier Heart  You may wonder how you can improve the health of your heart. If youre thinking about exercise, youre on the right track. You dont need to become an athlete, but you do need a certain amount of brisk exercise to help strengthen your heart. If you have been diagnosed with a heart condition, your doctor may recommend exercise to help stabilize your condition. To help make exercise a habit, choose safe, fun activities.       Be sure to check with your health care provider before starting an exercise program.    Why exercise?  Exercising regularly offers many healthy rewards. It can help you do all of the following:    Improve your blood cholesterol levels to help prevent further heart trouble    Lower your blood pressure to help prevent a stroke or heart attack    Control diabetes, or reduce your risk of getting this disease    Improve your heart and lung function    Reach and maintain a healthy weight    Make your muscles stronger and more limber so you can stay active    Prevent falls and fractures by slowing the loss of bone mass (osteoporosis)    Manage stress better  Exercise tips  Ease into your routine. Set small goals. Then build on them.  Exercise on most days. Aim for a total of 150 or more minutes of moderate to  vigorous intensity activity each week. Consider 40 minutes, 3 to 4 times a week. For best results, activity should last for 40 minutes on average. It is OK to work up to the 40 minute period over time. Examples of moderate-intensity activity is walking one mile in 15 minutes or 30 to 45 minutes of yard work.  Step up your daily activity level. Along with your exercise program,  try being more active throughout the day. Walk instead of drive. Do more household tasks or yard work.  Choose one or more activities you enjoy. Walking is one of the easiest things you can do. You can also try swimming, riding a bike, or taking an exercise class.  Stop exercising and call your doctor if you:    Have chest pain or feel dizzy or lightheaded    Feel burning, tightness, pressure, or heaviness in your chest, neck, shoulders, back, or arms    Have unusual shortness of breath    Have increased joint or muscle pain    Have palpitations or an irregular heartbeat      7712-6444 [employer]. [address] [place] [address]. All rights reserved. This information is not intended as a substitute for professional medical care. Always follow your healthcare professional's instructions.         Patient Education   Understanding [employer] MyPlate  The USDA (US Department of Agriculture) has guidelines to help you make healthy food choices. These are called MyPlate. MyPlate shows the food groups that make up healthy meals using the image of a place setting. Before you eat, think about the healthiest choices for what to put onto your plate or into your cup or bowl. To learn more about building a healthy plate, visit www.choosemyplate.gov.       The Food Groups    Fruits: Any fruit or 100% fruit juice counts as part of the Fruit Group. Fruits may be fresh, canned, frozen, or dried, and may be whole, cut-up, or pureed. Make half your plate fruits and vegetables.    Vegetables: Any vegetable or 100% vegetable juice counts as a member of the Vegetable Group. Vegetables may be fresh, frozen, canned, or dried. They can be served raw or cooked and may be whole, cut-up, or mashed. Make half your plate fruits and vegetables.     Grains: All foods made from grains are part of the Grains Group. These include wheat, rice, oats, cornmeal, and barley such as bread, pasta, oatmeal, cereal, tortillas, and grits.  Grains should be no more than a quarter of your plate. At least half of your grains should be whole grains.    Protein: This group includes meat, poultry, seafood, beans and peas, eggs, processed soy products (like tofu), nuts (including nut butters), and seeds. Make protein choices no more than a quarter of your plate. Meat and poultry choices should be lean or low fat.    Dairy: All fluid milk products and foods made from milk that contain calcium, like yogurt and cheese are part of the Dairy Group. (Foods that have little calcium, such as cream, butter, and cream cheese, are not part of the group.) Most dairy choices should be low-fat or fat-free.    Oils: These are fats that are liquid at room temperature. They include canola, corn, olive, soybean, and sunflower oil. Foods that are mainly oil include mayonnaise, certain salad dressings, and soft margarines. You should have only 5 to 7 teaspoons of oils a day. You probably already get this much from the food you eat.  Use [employer] to Help Build Your Meals  The SuperTracker can help you plan and track your meals and activity. You can look up individual foods to see or compare their nutritional value. You can get guidelines for what and how much you should eat. You can compare your food choices. And you can assess personal physical activities and see ways you can improve. Go to www.We[employer].gov/supertracker/.    1011-4599 [employer]. [address] [place] [address], [place] [address]. All rights reserved. This information is not intended as a substitute for professional medical care. Always follow your healthcare professional's instructions.           Patient Education   Signs of Hearing Loss  Hearing loss is a problem shared by many people. In fact, it is one of the most common health conditions, particularly as people age. Most people over age 65 have some hearing loss, and by age 80, almost everyone does. Because hearing loss usually occurs slowly  over the years, you may not realize your hearing ability has gotten worse.       Have your hearing checked  Contact your [facility] care provider if you:    Have to strain to hear normal conversation.    Have to watch other peoples faces very carefully to follow what theyre saying.    Need to ask people to repeat what theyve said.    Often misunderstand what people are saying.    Turn the volume of the television or radio up so high that others complain.    Feel that people are mumbling when theyre talking to you.    Find that the effort to hear leaves you feeling tired and irritated.    Notice, when using the phone, that you hear better with 1 ear than the other.    9576-6734 [employer]. [address] [place] [address], [place] [address]. All rights reserved. This information is not intended as a substitute for professional medical care. Always follow your healthcare professional's instructions.           Advance Directive  Patients advance directive was discussed and I am comfortable with the patients wishes.  Patient Education   Personalized Prevention Plan  You are due for the preventive services outlined below.  Your care team is available to assist you in scheduling these services.  If you have already completed any of these items, please share that information with your care team to update in your medical record.  Health Maintenance   Topic Date Due   ? HEPATITIS C SCREENING  1945   ? ZOSTER VACCINES (1 of 2) 09/20/1995   ? PNEUMOCOCCAL IMMUNIZATION 65+ LOW/MEDIUM RISK (2 of 2 - PPSV23) 01/18/2018   ? DXA SCAN  12/22/2018   ? INFLUENZA VACCINE RULE BASED (1) 08/01/2019   ? MEDICARE ANNUAL WELLNESS VISIT  11/13/2019   ? TD 18+ HE  06/22/2020   ? COLONOSCOPY  11/04/2020   ? MAMMOGRAM  01/02/2021   ? FALL RISK ASSESSMENT  01/20/2021   ? LIPID  11/13/2023   ? ADVANCE CARE PLANNING  11/13/2023

## 2021-06-17 NOTE — TELEPHONE ENCOUNTER
Telephone Encounter by Мария Huitron at 4/21/2020 10:51 AM     Author: Мария Huitron Service: -- Author Type: --    Filed: 4/21/2020 10:53 AM Encounter Date: 4/21/2020 Status: Signed    : Мария Huitron       Please advise this medication strength and quantity has already been denied (please see encounter from 3/13/2020). If provider would like to appeal this decision please write a letter of medical necessity and route original encounter from 3/13/2020 back to [facility] team.  We can then initiate appeal.

## 2021-06-17 NOTE — PROGRESS NOTES
Clinic Note    Assessment:     Assessment and Plan:  1. Essential hypertension  Her blood pressure is elevated in the exam room today.  I rechecked it and it was found to be 148/86.  Patient states that she has been on a higher dose of chlorthalidone in the past.  I will increase her medication to 50 mg daily and have her follow-up with me in 2 weeks for recheck of blood pressure as well as her swelling in her arms.  - Basic Metabolic Panel  - chlorthalidone (HYGROTEN) 25 MG tablet; Take 1 tablet (25 mg total) by mouth daily.  Dispense: 90 tablet; Refill: 3    2. LLQ abdominal pain  I had a discussion with her today about the results of her ultrasound as well as the x-ray.  I told her that there were moderate amounts of air present in her bowel based on the ultrasound and x-ray results and that this could have been contributing to some intermittent pain that she was experiencing.  She does have intermittent constipation for which he uses MiraLAX as well as a fiber supplement.    3. Sore throat  This could be due to dryness/postnasal drip.  She has not used the Astelin spray on her medication list for years.  I encouraged her to begin using the nasal spray twice daily as well as to use a humidifier in her room while sleeping.    - azelastine (ASTELIN) 137 mcg (0.1 %) nasal spray; 1 spray into each nostril 2 (two) times a day. Use in each nostril as directed  Dispense: 30 mL; Refill: 12     Patient Instructions   I discussed the results of your ultrasound of your x-ray with you today.  These were normal results.  You did have a moderate amount of gas present on your ultrasound as well as your x-ray.  I suspect this is why you are having intermittent bloating and pain in her left lower quadrant of your abdomen.    We will need to get some lab work today to check your kidney function and electrolytes.    I am going to have you increase your chlorthalidone to 50 mg daily.    Make sure to follow-up with me in 2 weeks so  that we can recheck her blood pressure, check your swelling, get a weight, and recheck your lab work.    I sent in a prescription for the Astelin nasal spray to help with your nasal drainage and sore throat.  You can use 1 spray into each nostril 2 times per day.  This should also help your intermittent cough.             Subjective:      Patient presents to clinic today for follow-up of her hypertension and swelling.    Patient was originally seen by her PCP, Dr. Neetu Regan, on 3/8/2018, approximately 1 month ago.  At that time it was thought that her lisinopril may have been contributing to her a problematic cough.  She had her lisinopril switched to chlorthalidone and was told to follow-up with me in 2 weeks for a BP check and a BMP.    Patient says that she was dealing with some swelling with some swelling in her arms when she last saw Dr. Regan. Chlorthalidone seems to be helping a little. She has not noticed much difference in urination since starting the medication. She avoids salt.  She states that she does have some fluid retention as her baseline however she is unable to give a detailed history as to the progression of her swelling in her arms.  For example, she is unsure  if her swelling is worse today than it was about 1 year ago.    She does not check her BPs at home. It is 146/80 today in the exam room.     She is not having any pain in her abdomen anymore. She occasionally experiences constipation for which she uses miralax. She has fiber supplements but does not use them everyday.     She has been dealing with some soreness in her throat. She was coughing last week but is better this week. Today she is feeling a little bit better today. When she wakes up she feels as though there is some swelling in her throat. She does not use her nasal spray that is on her medication list.     The following portions of the patient's history were reviewed and updated as appropriate: Allergies, medications,  problems, prior note.    Review of Systems:    Review is negative except for what is mentioned above.     Social Hx:    History   Smoking Status     Never Smoker   Smokeless Tobacco     Never Used         Objective:     Vitals:    04/11/18 1029   BP: 146/80   Pulse: 93   SpO2: 96%   Weight: 147 lb 1.6 oz (66.7 kg)       Exam:    General: No apparent distress. Calm. Alert and Oriented X3. Pt behavior is appropriate.  Head:Atraumatic. Normocephalic, non-tender to palpation  Neck: Supple. No JVD. Full ROM. No adenopathy  Eyes: PERRL, No discharge. No strabismus. No nystagmus.  Ears: TMs pearly gray with landmarks visible.   Nose/Mouth/Throat: Patent nares, no oral lesions, pharynx erythematous but without exudate. Uvula mid-line. Nasal septum mid-line. Clear turbinates.   Lymph: No axillar or cervical adenopathy.   Chest/Lungs: Normal chest wall, clear to auscultation, normal respiratory effort and rate.   Heart/Pulses: Regular rate and rhythm, strong and equal radial pulses, no murmurs. Capillary refill <2 seconds.  She does have +1 pitting edema in her bilateral forearms.  Abdomen: Soft, no palpable masses. No hepatosplenomegaly, no tenderness with palpation noted. Bowel sounds active in all quadrants. No increased tympany.   Genitalia: Not examined.   Musculoskeletal: No CVA tenderness with palpation. Good ROM with extremities.   Neurologic: Interactive, alert, no focal findings, CNs intact.   Skin: Warm, dry. Normal hair pattern. Free of lesions. Normal skin turgor.       Patient Active Problem List   Diagnosis     Allergic Rhinitis     Anxiety     GERD (gastroesophageal reflux disease)     Osteoporosis     Hypothyroidism     Vitamin D Deficiency     Hyperlipidemia     Hypertension     Respiratory failure with hypoxia     Current Outpatient Prescriptions   Medication Sig Dispense Refill     azelastine (ASTELIN) 137 mcg (0.1 %) nasal spray 1 spray into each nostril 2 (two) times a day. Use in each nostril as  directed 30 mL 12     chlorthalidone (HYGROTEN) 25 MG tablet Take 1 tablet (25 mg total) by mouth daily. 90 tablet 3     cholecalciferol, vitamin D3, 1,000 unit tablet Take 5,000 Units by mouth daily.        lactulose 10 gram/15 mL solution Take 15ml twice daily as needed for constipation 240 mL 0     levothyroxine (SYNTHROID, LEVOTHROID) 75 MCG tablet TAKE 1 TABLET BY MOUTH DAILY AS DIRECTED 90 tablet 2     omeprazole (PRILOSEC) 20 MG capsule TAKE 1 CAPSULE BY MOUTH TWICE DAILY 180 capsule 2     rosuvastatin (CRESTOR) 20 MG tablet TAKE 1 TABLET (20 MG TOTAL) BY MOUTH BEDTIME. 90 tablet 2     No current facility-administered medications for this visit.        Total time spent with patient was 25 minutes with >50% of time spent in face-to-face counseling regarding the above plan       Mio Tabares (Rob), ANNA    4/11/2018

## 2021-06-18 NOTE — PATIENT INSTRUCTIONS - HE
Patient Instructions by Rusty Mcgowan MD at 9/10/2020  3:00 PM     Author: Rusty Mcgowan MD Service: -- Author Type: Physician    Filed: 9/10/2020  3:35 PM Encounter Date: 9/10/2020 Status: Addendum    : Rusty Mcgowan MD (Physician)    Related Notes: Original Note by Rusty Mcgowan MD (Physician) filed at 9/10/2020  3:25 PM       Check with your insurance about shingle vaccine and tetanus vaccine.  Flu shot today.  Start taking omega 3 fish oil.  To schedule colonoscopy.      Patient Education     Exercise for a Healthier Heart  You may wonder how you can improve the health of your heart. If youre thinking about exercise, youre on the right track. You dont need to become an athlete, but you do need a certain amount of brisk exercise to help strengthen your heart. If you have been diagnosed with a heart condition, your doctor may recommend exercise to help stabilize your condition. To help make exercise a habit, choose safe, fun activities.       Be sure to check with your health care provider before starting an exercise program.    Why exercise?  Exercising regularly offers many healthy rewards. It can help you do all of the following:    Improve your blood cholesterol levels to help prevent further heart trouble    Lower your blood pressure to help prevent a stroke or heart attack    Control diabetes, or reduce your risk of getting this disease    Improve your heart and lung function    Reach and maintain a healthy weight    Make your muscles stronger and more limber so you can stay active    Prevent falls and fractures by slowing the loss of bone mass (osteoporosis)    Manage stress better  Exercise tips  Ease into your routine. Set small goals. Then build on them.  Exercise on most days. Aim for a total of 150 or more minutes of moderate to  vigorous intensity activity each week. Consider 40 minutes, 3 to 4 times a week. For best results, activity should last for 40 minutes on average. It is OK  to work up to the 40 minute period over time. Examples of moderate-intensity activity is walking one mile in 15 minutes or 30 to 45 minutes of yard work.  Step up your daily activity level. Along with your exercise program, try being more active throughout the day. Walk instead of drive. Do more household tasks or yard work.  Choose one or more activities you enjoy. Walking is one of the easiest things you can do. You can also try swimming, riding a bike, or taking an exercise class.  Stop exercising and call your doctor if you:    Have chest pain or feel dizzy or lightheaded    Feel burning, tightness, pressure, or heaviness in your chest, neck, shoulders, back, or arms    Have unusual shortness of breath    Have increased joint or muscle pain    Have palpitations or an irregular heartbeat      8424-6938 [employer]. [address] [place] [address]. All rights reserved. This information is not intended as a substitute for professional medical care. Always follow your healthcare professional's instructions.         Patient Education   Understanding [employer] MyPlate  The USDA (US Department of Agriculture) has guidelines to help you make healthy food choices. These are called MyPlate. MyPlate shows the food groups that make up healthy meals using the image of a place setting. Before you eat, think about the healthiest choices for what to put onto your plate or into your cup or bowl. To learn more about building a healthy plate, visit www.choosemyplate.gov.       The Food Groups    Fruits: Any fruit or 100% fruit juice counts as part of the Fruit Group. Fruits may be fresh, canned, frozen, or dried, and may be whole, cut-up, or pureed. Make half your plate fruits and vegetables.    Vegetables: Any vegetable or 100% vegetable juice counts as a member of the Vegetable Group. Vegetables may be fresh, frozen, canned, or dried. They can be served raw or cooked and may be whole, cut-up, or mashed. Make  half your plate fruits and vegetables.     Grains: All foods made from grains are part of the Grains Group. These include wheat, rice, oats, cornmeal, and barley such as bread, pasta, oatmeal, cereal, tortillas, and grits. Grains should be no more than a quarter of your plate. At least half of your grains should be whole grains.    Protein: This group includes meat, poultry, seafood, beans and peas, eggs, processed soy products (like tofu), nuts (including nut butters), and seeds. Make protein choices no more than a quarter of your plate. Meat and poultry choices should be lean or low fat.    Dairy: All fluid milk products and foods made from milk that contain calcium, like yogurt and cheese are part of the Dairy Group. (Foods that have little calcium, such as cream, butter, and cream cheese, are not part of the group.) Most dairy choices should be low-fat or fat-free.    Oils: These are fats that are liquid at room temperature. They include canola, corn, olive, soybean, and sunflower oil. Foods that are mainly oil include mayonnaise, certain salad dressings, and soft margarines. You should have only 5 to 7 teaspoons of oils a day. You probably already get this much from the food you eat.  Use ubitus to Help Build Your Meals  The SuperTracker can help you plan and track your meals and activity. You can look up individual foods to see or compare their nutritional value. You can get guidelines for what and how much you should eat. You can compare your food choices. And you can assess personal physical activities and see ways you can improve. Go to www.appsFreedommyplate.gov/supertracker/.    3069-1924 [employer]. [address] [place] [address], [place] [address]. All rights reserved. This information is not intended as a substitute for professional medical care. Always follow your healthcare professional's instructions.           Patient Education   Signs of Hearing Loss  Hearing loss is a problem shared by  many people. In fact, it is one of the most common health conditions, particularly as people age. Most people over age 65 have some hearing loss, and by age 80, almost everyone does. Because hearing loss usually occurs slowly over the years, you may not realize your hearing ability has gotten worse.       Have your hearing checked  Contact your [facility] care provider if you:    Have to strain to hear normal conversation.    Have to watch other peoples faces very carefully to follow what theyre saying.    Need to ask people to repeat what theyve said.    Often misunderstand what people are saying.    Turn the volume of the television or radio up so high that others complain.    Feel that people are mumbling when theyre talking to you.    Find that the effort to hear leaves you feeling tired and irritated.    Notice, when using the phone, that you hear better with 1 ear than the other.    8919-7510 [employer]. [address] [place] [address]. All rights reserved. This information is not intended as a substitute for professional medical care. Always follow your healthcare professional's instructions.         Patient Education   Urinary Incontinence, Female (Adult)  Urinary incontinence means loss of control of the bladder. This problem affects many women, especially as they get older. If you have incontinence, you may be embarrassed to ask for help. But know that this problem can be treated.  Types of Incontinence  There are different types of incontinence. Two of the main types are described here. You can have more than one type.    Stress incontinence. With this type, urine leaks when pressure (stress) is put on the bladder. This may happen when you cough, sneeze, or laugh. Stress incontinence most often occurs because the pelvic floor muscles that support the bladder and urethra are weak. This can happen after pregnancy and vaginal childbirth or a hysterectomy. It can also be due to excess body  weight or hormone changes.    Urge incontinence (also called overactive bladder). With this type, a sudden urge to urinate is felt often. This may happen even though there may not be much urine in the bladder. The need to urinate often during the night is common. Urge incontinence most often occurs because of bladder spasms. This may be due to bladder irritation or infection. Damage to bladder nerves or pelvic muscles, constipation, and certain medicines can also lead to urge incontinence.  Treatment of urinary incontinence depends on the cause. Further evaluation is needed to find the type you have. This will likely include an exam and certain tests. Based on the results, you and your healthcare provider can then plan treatment. Until a diagnosis is made, the home care tips below can help relieve symptoms.  Home care    Do pelvic floor muscle exercises, if they are prescribed. The pelvic floor muscles help support the bladder and urethra. Many women find that their symptoms improve when doing special exercises that strengthen these muscles. To do the exercises contract the muscles you would use to stop your stream of urine, but do this when youre not urinating. Hold for 10 seconds, then relax. Repeat 10 to 20 times in a row, at least 3 times a day. Your provider may give you other instructions for how to do the exercises and how often.    Keep a bladder diary. This helps track how often and how much you urinate over a set period of time. Bring this diary with you to your next visit with the provider. The information can help your provider learn more about your bladder problem.    Lose weight, if advised to by your provider. Excess weight puts pressure on the bladder. Your provider can help you create a weight-loss plan thats right for you. This may include exercising more and making certain diet changes.    Don't consume foods and drinks that may irritate the bladder. These can include alcohol and caffeinated  drinks.    Quit smoking. Smoking and other tobacco use can lead to chronic cough that strains the pelvic floor muscles. Smoking may also damage the bladder and urethra. Talk with your provider about treatments or methods you can use to quit smoking.    If drinking large amounts of fluid causes you to have symptoms, you may be advised to limit your fluid intake. You may also be advised to drink most of your fluids during the day and to limit fluids at night.    If youre worried about urine leakage or accidents, you may wear absorbent pads to catch urine. Change the pads often. This helps reduce discomfort. It may also reduce the risk of skin or bladder infections.  Follow-up care  Follow up with your healthcare provider, or as directed. It may take some to find the right treatment for your problem. Your treatment plan may include special therapies or medicines. Certain procedures or surgery may also be options. Be sure to discuss any questions you have with your provider.  When to seek medical advice  Call the healthcare provider right away if any of these occur:    Fever of 100.4 F (38 C) or higher, or as directed by your provider    Bladder pain or fullness    Abdominal swelling    Nausea or vomiting    Back pain    Weakness, dizziness or fainting  Date Last Reviewed: 10/1/2017    4838-3708 [employer]. [address] [place] [address]. All rights reserved. This information is not intended as a substitute for professional medical care. Always follow your healthcare professional's instructions.     Patient Education   Preventing Falls in the Home  As you get older, falls are more likely. Thats because your reaction time slows. Your muscles and joints may also get stiffer, making them less flexible. Illness, medications, and vision changes can also affect your balance. A fall could leave you unable to live on your own. To make your home safer, follow these tips:    Floors    Put nonskid pads  under area rugs.    Remove throw rugs.    Replace worn floor coverings.    Tack carpets firmly to each step on carpeted stairs. Put nonskid strips on the edges of uncarpeted stairs.    Keep floors and stairs free of clutter and cords.    Arrange furniture so there are clear pathways.    Clean up any spills right away.    Bathrooms    Install grab bars in the tub or shower.    Apply nonskid strips or put a nonskid rubber mat in the tub or shower.    Sit on a bath chair to bathe.    Use bathmats with nonskid backing.    Lighting    Keep a flashlight in each room.    Put a nightlight along the pathway between the bedroom and the bathroom.    4013-2849 [employer]. [address] [place] [address]. All rights reserved. This information is not intended as a substitute for professional medical care. Always follow your healthcare professional's instructions.           Advance Directive  Patients advance directive was discussed and I am comfortable with the patients wishes.  Patient Education   Personalized Prevention Plan  You are due for the preventive services outlined below.  Your care team is available to assist you in scheduling these services.  If you have already completed any of these items, please share that information with your care team to update in your medical record.  Health Maintenance   Topic Date Due   ? ZOSTER VACCINES (1 of 2) 09/20/1995   ? DXA SCAN  12/22/2018   ? TD 18+ HE  06/22/2020   ? INFLUENZA VACCINE RULE BASED (1) 08/01/2020   ? MEDICARE ANNUAL WELLNESS VISIT  01/20/2021   ? FALL RISK ASSESSMENT  01/20/2021   ? MAMMOGRAM  02/07/2022   ? ADVANCE CARE PLANNING  01/20/2025   ? LIPID  08/20/2025   ? COLORECTAL CANCER SCREENING  11/04/2025   ? HEPATITIS C SCREENING  Completed   ? PNEUMOCOCCAL IMMUNIZATION 65+ LOW/MEDIUM RISK  Completed   ? HEPATITIS B VACCINES  Aged Out

## 2021-06-18 NOTE — PROGRESS NOTES
Clinic Note    Assessment:     Assessment and Plan:  1. Hypertension  Plan is to recheck a BMP today.  She is taking her potassium supplement as prescribed.  I did have a conversation with her about switching to Maxide 25 today.  She is hesitant about making the switch right now would like to await the results of her lab work.  - Basic Metabolic Panel    2. Hypercholesteremia  She is fasting today.  Prior cholesterol levels were elevated.  She is taking Crestor 20 mg.  She would like her lipid profile rechecked today.  We will redo this for her.  - Lipid Profile     Patient Instructions   We will re-check your potassium levels today. If everything looks normal and your potassium looks improved with the potassium supplement, we will not plan of changing anything.     We will call you if there needs to be any changes.         No Follow-up on file.         Subjective:      Patient comes the clinic today for follow-up of her hypertension.    Patient was initially seen by me on 4/25/4/25/2018, approximately 1 month ago.  At that time her blood pressure was well controlled.  She is currently taking chlorthalidone 50 mg.  A BMP was drawn at that time which showed her potassium to be low, at 3.1.  Plan was to have her start a potassium supplement and to have her follow-up with me for a recheck of her blood pressure as well as a BMP.    She is feeling well today.  She is taking her potassium supplement as prescribed.  She is fasting today and would like her cholesterol levels rechecked.  No side effects reported from the statin medication.  She is down about 2 pounds since we last checked    The following portions of the patient's history were reviewed and updated as appropriate: Allergies, Medications, Problem List.     Review of Systems:    Review is otherwise negative except for what is mentioned above.     Social Hx:    History   Smoking Status     Never Smoker   Smokeless Tobacco     Never Used         Objective:      Vitals:    05/30/18 1014 05/30/18 1029   BP: 140/78 130/72   Pulse: 70    Weight: 143 lb 4.8 oz (65 kg)        Exam:    General: No apparent distress. Calm. Alert and Oriented X3. Pt behavior is appropriate.  Chest/Lungs: Normal chest wall, clear to auscultation, normal respiratory effort and rate.   Heart/Pulses: Regular rate and rhythm, strong and equal radial pulses, no murmurs. Capillary refill <2 seconds. No edema.   Neurologic: Interactive, alert, no focal findings, CNs intact.   Skin: Warm, dry. Normal hair pattern. Free of lesions. Normal skin turgor.       Patient Active Problem List   Diagnosis     Allergic Rhinitis     Anxiety     GERD (gastroesophageal reflux disease)     Osteoporosis     Hypothyroidism     Vitamin D Deficiency     Hyperlipidemia     Hypertension     Respiratory failure with hypoxia     Current Outpatient Prescriptions   Medication Sig Dispense Refill     azelastine (ASTELIN) 137 mcg (0.1 %) nasal spray 1 spray into each nostril 2 (two) times a day. Use in each nostril as directed 30 mL 12     chlorthalidone (HYGROTEN) 50 MG tablet Take 1 tablet (50 mg total) by mouth daily. 90 tablet 1     cholecalciferol, vitamin D3, 1,000 unit tablet Take 5,000 Units by mouth daily.        lactulose 10 gram/15 mL solution Take 15ml twice daily as needed for constipation 240 mL 0     levothyroxine (SYNTHROID, LEVOTHROID) 75 MCG tablet TAKE 1 TABLET BY MOUTH DAILY AS DIRECTED 90 tablet 2     omeprazole (PRILOSEC) 20 MG capsule TAKE 1 CAPSULE BY MOUTH TWICE DAILY 180 capsule 2     potassium chloride (K-DUR,KLOR-CON) 20 MEQ tablet Take 1 tablet (20 mEq total) by mouth daily. 30 tablet 1     rosuvastatin (CRESTOR) 20 MG tablet TAKE 1 TABLET (20 MG TOTAL) BY MOUTH BEDTIME. 90 tablet 2     No current facility-administered medications for this visit.        I spent 20 minutes with patient face to face, of which >50% was counseling regarding the above plan       Mio Tabares (Rob), ANNA    5/30/2018

## 2021-06-20 NOTE — LETTER
Letter by Rusty Mcgowan MD at      Author: Rusty Mcgowan MD Service: -- Author Type: --    Filed:  Encounter Date: 8/26/2020 Status: (Other)         Chanel Regan  [address] [place] [address]             August 26, 2020         Dear Ms. Regan,    Below are the results from your recent visit:    Resulted Orders   HM2(CBC w/o Differential)   Result Value Ref Range    WBC 7.3 4.0 - 11.0 thou/uL    RBC 5.06 3.80 - 5.40 mill/uL    Hemoglobin 15.3 12.0 - 16.0 g/dL    Hematocrit 45.6 35.0 - 47.0 %    MCV 90 80 - 100 fL    MCH 30.2 27.0 - 34.0 pg    MCHC 33.5 32.0 - 36.0 g/dL    RDW 12.3 11.0 - 14.5 %    Platelets 192 140 - 440 thou/uL    MPV 8.3 7.0 - 10.0 fL   Lipid Profile   Result Value Ref Range    Triglycerides 237 (H) <=149 mg/dL    Cholesterol 165 <=199 mg/dL    LDL Calculated 72 <=129 mg/dL    HDL Cholesterol 46 (L) >=50 mg/dL    Patient Fasting > 8hrs? Yes    Thyroid Stimulating Hormone (TSH)   Result Value Ref Range    TSH 2.60 0.30 - 5.00 uIU/mL   Vitamin D, Total (25-Hydroxy)   Result Value Ref Range    Vitamin D, Total (25-Hydroxy) 80.4 (H) 30.0 - 80.0 ng/mL    Narrative    Deficiency <10.0 ng/mL  Insufficiency 10.0-29.9 ng/mL  Sufficiency 30.0-80.0 ng/mL  Toxicity (possible) >100.0 ng/mL   Comprehensive Metabolic Panel   Result Value Ref Range    Sodium 143 136 - 145 mmol/L    Potassium 3.7 3.5 - 5.0 mmol/L    Chloride 104 98 - 107 mmol/L    CO2 27 22 - 31 mmol/L    Anion Gap, Calculation 12 5 - 18 mmol/L    Glucose 98 70 - 125 mg/dL    BUN 13 8 - 28 mg/dL    Creatinine 0.89 0.60 - 1.10 mg/dL    GFR MDRD Af Amer >60 >60 mL/min/1.73m2    GFR MDRD Non Af Amer >60 >60 mL/min/1.73m2    Bilirubin, Total 0.6 0.0 - 1.0 mg/dL    Calcium 10.4 8.5 - 10.5 mg/dL    Protein, Total 7.8 6.0 - 8.0 g/dL    Albumin 4.2 3.5 - 5.0 g/dL    Alkaline Phosphatase 58 45 - 120 U/L    AST 23 0 - 40 U/L    ALT 27 0 - 45 U/L    Narrative    Fasting Glucose reference range is 70-99 mg/dL per  American Diabetes  Association (ADA) guidelines.   Urinalysis-UC if Indicated   Result Value Ref Range    Color, UA Yellow Colorless, Yellow, Straw, Light Yellow    Clarity, UA Clear Clear    Glucose, UA Negative Negative    Bilirubin, UA Negative Negative    Ketones, UA Negative Negative    Specific Gravity, UA 1.020 1.005 - 1.030    Blood, UA Negative Negative    pH, UA 7.0 5.0 - 8.0    Protein, UA Negative Negative mg/dL    Urobilinogen, UA 0.2 E.U./dL 0.2 E.U./dL, 1.0 E.U./dL    Nitrite, UA Negative Negative    Leukocytes, UA Negative Negative    Narrative    Microscopic not indicated  UC not indicated       Vitamin D is too high so please your Vitamin D supplement for 2 months. In 2 months, you can restart Vitamin D 1000 U daily. Your triglycerides are high so eat a low fat diet and work on weight loss.    Please call with questions or contact us using [employer].    Sincerely,        Electronically signed by Rusty Mcgowan MD

## 2021-06-20 NOTE — LETTER
Letter by Emili Martinez NP at      Author: Emili Martinez NP Service: -- Author Type: --    Filed:  Encounter Date: 1/21/2020 Status: Signed         [address]el [address]  [place] [address]             January 21, 2020         Dear Ms. Regan,    Below are the results from your recent visit:    Resulted Orders   Lipid [place] FASTING   Result Value Ref Range    Cholesterol 185 <=199 mg/dL    Triglycerides 259 (H) <=149 mg/dL    HDL Cholesterol 47 (L) >=50 mg/dL    LDL Calculated 86 <=129 mg/dL    Patient Fasting > 8hrs? Yes    Thyroid Cascade   Result Value Ref Range    TSH 1.34 0.30 - 5.00 uIU/mL   Basic Metabolic Panel   Result Value Ref Range    Sodium 142 136 - 145 mmol/L    Potassium 3.6 3.5 - 5.0 mmol/L    Chloride 101 98 - 107 mmol/L    CO2 30 22 - 31 mmol/L    Anion Gap, Calculation 11 5 - 18 mmol/L    Glucose 88 70 - 125 mg/dL    Calcium 10.8 (H) 8.5 - 10.5 mg/dL    BUN 14 8 - 28 mg/dL    Creatinine 0.81 0.60 - 1.10 mg/dL    GFR MDRD Af Amer >60 >60 mL/min/1.73m2    GFR MDRD Non Af Amer >60 >60 mL/min/1.73m2    Narrative    Fasting Glucose reference range is 70-99 mg/dL per  American Diabetes Association (ADA) guidelines.   Hepatitis C Antibody (Anti-HCV)   Result Value Ref Range    Hepatitis C Ab Negative Negative        Labs are all stable.    Your calcium is slightly elevated again, so I added on an additional test to make sure nothing is wrong  with your parathyroid hormone.  This has previously been tested and has been normal.  I will let you  the results of this.      Emili Martinez NP     Please call with questions or contact us using [employer].    Sincerely,        Electronically signed by Emili Martinez NP

## 2021-06-20 NOTE — PROGRESS NOTES
Clinic Note    Assessment:     Assessment and Plan:  1. Essential hypertension  Uncontrolled.  We are going to switch her from chlorthalidone to Maxzide today to help manage her hypokalemia in the context of her disliking her potassium chloride tablets.  She is going to return to the clinic in 3 weeks for recheck of her blood pressure as well as recheck of her labs.  She has a complete physical exam with her PCP, Dr. eNetu Regan in 6 weeks.  - Comprehensive Metabolic Panel    2. Fatigue  Nonspecific complaints today.  Unclear etiology.  Perhaps this could be as simple as improving her sleeping habits.  She would like labs added on to her workup for today.  She denies issues with snoring.  - Comprehensive Metabolic Panel  - HM2(CBC w/o Differential)  - Thyroid Stimulating Hormone (TSH)  - Vitamin D, Total (25-Hydroxy)  - Vitamin B12     Patient Instructions   Stop taking the chlorthalidone.  We are going to start you on a new blood pressure medication called triamterene-hydrochlorothiazide.    I sent the new blood pressure medication into the pharmacy for you.    Stop taking the potassium chloride pills.    By a blood pressure cuff and start taking your blood pressure twice daily-once in the morning and once in the evening.    We are going to get some lab work today to check your potassium and kidney function.    We are going to schedule you  for an appointment to come back and see me in 3 weeks; it is extremely important that you take your blood pressure medication prior to that appointment.  At that to follow-up appointment, we will get more lab work to see how your potassium levels are on the new medication.    It is important that we keep your blood pressure less than 140/90.    No Follow-up on file.         Subjective:      Chanel Regan is a 73 y.o. female who comes the clinic today for follow-up of her blood pressure as well as her electrolytes.    Patient had been having some issues with hypokalemia  secondary to her chlorthalidone use.  She was prescribed potassium chloride tablets and was instructed to take one 20 mEq tablet twice daily to help compensate for this.    Today, the patient states that she has not yet taken her blood pressure medications.  Her blood pressure is 150/84 in the exam room.  She admits that she has not been taking her potassium chloride pills as often as she was instructed-sometimes she is only taking 1/day.  We have had discussions about switching from chlorthalidone to triamterene hydrochlorothiazide, but she has resisted.    She feels fatigued today.  She would like her thyroid labs rechecked.  She admits that she has not been sleeping as well as she normally does, due to some stressors at home which she is nonspecific about today.  She denies snoring.  No blood in her stools.  No fevers.  No nausea or vomiting.  No new cough.  No dysuria.      The following portions of the patient's history were reviewed and updated as appropriate: Allergies, medications, problems, prior note.    Review of Systems:    Review is otherwise negative except for what is mentioned above.     Social Hx:    History   Smoking Status     Never Smoker   Smokeless Tobacco     Never Used         Objective:     Vitals:    10/02/18 0937   BP: 150/84   Pulse: 95   SpO2: 96%   Weight: 145 lb (65.8 kg)       Exam:    General: No apparent distress. Calm. Alert and Oriented X3. Pt behavior is appropriate.  Chest/Lungs: Normal chest wall, clear to auscultation, normal respiratory effort and rate.   Heart/Pulses: Regular rate and rhythm, strong and equal radial pulses, no murmurs. Capillary refill <2 seconds. No edema.   Musculoskeletal: No CVA tenderness with palpation. Good ROM with extremities.   Neurologic: Interactive, alert, no focal findings, CNs intact.   Skin: Warm, dry. Normal hair pattern. Free of lesions. Normal skin turgor.       Patient Active Problem List   Diagnosis     Allergic Rhinitis     Anxiety      GERD (gastroesophageal reflux disease)     Osteoporosis     Hypothyroidism     Vitamin D Deficiency     Hyperlipidemia     Hypertension     Respiratory failure with hypoxia (H)     Current Outpatient Prescriptions   Medication Sig Dispense Refill     azelastine (ASTELIN) 137 mcg (0.1 %) nasal spray 1 spray into each nostril 2 (two) times a day. Use in each nostril as directed 30 mL 12     chlorthalidone (HYGROTEN) 50 MG tablet TAKE 1 TABLET (50 MG TOTAL) BY MOUTH DAILY. 90 tablet 2     cholecalciferol, vitamin D3, 1,000 unit tablet Take 5,000 Units by mouth daily.        lactulose 10 gram/15 mL solution Take 15ml twice daily as needed for constipation 240 mL 0     levothyroxine (SYNTHROID, LEVOTHROID) 75 MCG tablet TAKE 1 TABLET BY MOUTH DAILY AS DIRECTED 90 tablet 2     omeprazole (PRILOSEC) 20 MG capsule TAKE 1 CAPSULE BY MOUTH TWICE DAILY 180 capsule 2     potassium chloride (K-DUR,KLOR-CON) 20 MEQ tablet Take 1 tablet (20 mEq total) by mouth 2 (two) times a day. 60 tablet 2     rosuvastatin (CRESTOR) 10 MG tablet TAKE ONE TABLET BY MOUTH AT BEDTIME 90 tablet 2     rosuvastatin (CRESTOR) 20 MG tablet TAKE 1 TABLET (20 MG TOTAL) BY MOUTH BEDTIME. 90 tablet 2     No current facility-administered medications for this visit.        I spent 30 minutes with patient face to face, of which >50% was counseling regarding the above plan       Mio Tabares (Rob), ANNA    10/2/2018

## 2021-06-21 NOTE — PROGRESS NOTES
Assessment and Plan:     1. Need for immunization against influenza  Done today  - Influenza High Dose, Seasonal 65+ yrs    2. Anxiety  She prefers to manage w/o meds    3. Gastroesophageal reflux disease without esophagitis  She's stable.  Labs pending w/ her GI sx.  - HM2(CBC w/o Differential)    4. Age related osteoporosis, unspecified pathological fracture presence  She's not interested in prolia.    5. Hypothyroidism, unspecified type  She's having some sx to suggest hypo.    6. Vitamin D deficiency  Repeating today  - Vitamin D, Total (25-Hydroxy)    7. Mixed hyperlipidemia  She's not certain what dose she's on 20 vs 10  - Comprehensive Metabolic Panel  - Lipid Cascade    8. Essential hypertension  Improved readings today.  Labs for follow up.  - Comprehensive Metabolic Panel    9. Routine general medical examination at a health care facility  Paperwork given for [facility]. Labs today.  Flu shot today.  Encouraged increased exercise.    The patient's current medical problems were reviewed.    The following high BMI interventions were performed this visit: weight monitoring  The following health maintenance schedule was reviewed with the patient and provided in printed form in the after visit summary:   Health Maintenance   Topic Date Due     ZOSTER VACCINES (1 of 2) 09/20/1995     INFLUENZA VACCINE RULE BASED (1) 11/20/2018 (Originally 8/1/2018)     DXA SCAN  12/22/2018     FALL RISK ASSESSMENT  03/08/2019     MAMMOGRAM  12/28/2019     TD 18+ HE  06/22/2020     COLONOSCOPY  11/04/2020     ADVANCE DIRECTIVES DISCUSSED WITH PATIENT  03/08/2023     PNEUMOCOCCAL POLYSACCHARIDE VACCINE AGE 65 AND OVER  Completed     PNEUMOCOCCAL CONJUGATE VACCINE FOR ADULTS (PCV13 OR PREVNAR)  Completed        Subjective:   Chief Complaint: Chanel Regan is an 73 y.o. female here for an Annual Wellness visit.     HPI:  She's doing very well.  She's noted some hearing loss and urinary incontienence.  She still has some intermittent  left sided abd fullness, worse w/ constipation.  She just got new glasses.    Review of Systems:    Please see above.  The rest of the review of systems are negative for all systems.    Patient Care Team:  Neetu Regan MD as PCP - General     Patient Active Problem List   Diagnosis     Allergic Rhinitis     Anxiety     GERD (gastroesophageal reflux disease)     Osteoporosis     Hypothyroidism     Vitamin D Deficiency     Hyperlipidemia     Hypertension     Past Medical History:   Diagnosis Date     Anxiety      GERD (gastroesophageal reflux disease)      Hemorrhoids      Hyperlipidemia      Hypertension      Hypothyroidism      Osteoporosis      Pneumonia       No past surgical history on file.   Family History   Problem Relation Age of Onset     Breast cancer Sister      Cancer Sister      Osteoporosis Sister      COPD Sister       Social History     Socioeconomic History     Marital status:      Spouse name: Not on file     Number of children: Not on file     Years of education: Not on file     Highest education level: Not on file   Social Needs     Financial resource strain: Not on file     Food insecurity - worry: Not on file     Food insecurity - inability: Not on file     Transportation needs - medical: Not on file     Transportation needs - non-medical: Not on file   Occupational History     Not on file   Tobacco Use     Smoking status: Never Smoker     Smokeless tobacco: Never Used   Substance and Sexual Activity     Alcohol use: No     Drug use: Not on file     Sexual activity: Not on file   Other Topics Concern     Not on file   Social History Narrative     Not on file      Current Outpatient Medications   Medication Sig Dispense Refill     azelastine (ASTELIN) 137 mcg (0.1 %) nasal spray 1 spray into each nostril 2 (two) times a day. Use in each nostril as directed 30 mL 12     cholecalciferol, vitamin D3, 1,000 unit tablet Take 5,000 Units by mouth daily.        lactulose 10 gram/15 mL  "solution Take 15ml twice daily as needed for constipation 240 mL 0     levothyroxine (SYNTHROID, LEVOTHROID) 75 MCG tablet TAKE 1 TABLET BY MOUTH DAILY AS DIRECTED 90 tablet 2     omeprazole (PRILOSEC) 20 MG capsule TAKE 1 CAPSULE BY MOUTH TWICE DAILY 180 capsule 2     triamterene-hydrochlorothiazide (MAXZIDE-25) 37.5-25 mg per tablet Take 1 tablet by mouth daily. 90 tablet 1     ondansetron (ZOFRAN) 4 MG tablet Take 1 tablet (4 mg total) by mouth daily as needed for nausea. 30 tablet 1     rosuvastatin (CRESTOR) 10 MG tablet TAKE ONE TABLET BY MOUTH AT BEDTIME 90 tablet 2     rosuvastatin (CRESTOR) 20 MG tablet TAKE 1 TABLET (20 MG TOTAL) BY MOUTH BEDTIME. 90 tablet 2     No current facility-administered medications for this visit.       Objective:   Vital Signs:   Visit Vitals  /70 (Patient Site: Right Arm, Patient Position: Sitting, Cuff Size: Adult Large)   Pulse 80   Ht 5' 0.83\" (1.545 m)   Wt 146 lb 6.4 oz (66.4 kg)   BMI 27.82 kg/m         VisionScreening:  No exam data present     PHYSICAL EXAM:  Constitutional:  Reveals an alert, pleasant adult female.   Vitals:  Noted.   Ears: TM's normal bilaterally   Eyes: PERRL, conjunctiva/corneas clear, EOM's intact   Throat: Lips, mucosa, and tongue normal; teeth and gums normal   Neck: Normal ROM, no carotid bruits, no thyromegaly   Lungs: Clear to auscultation bilaterally, respirations unlabored.   Breast exam:  Normal skin overlying her breasts bilaterally no discrete nodule is palpable in the axilla bilaterally  Heart: Regular rate and rhythm, S1 and S2 normal, no murmur, rub, or gallop,   Abdomen: Soft, non-tender, bowel sounds active, no masses, no organomegaly   Extremities: Extremities normal, atraumatic, no cyanosis or edema   Pelvic:Not examined  Skin: Skin color, texture, turgor normal, no rashes or lesions   Neurologic: Normal       Assessment Results 11/13/2018   Activities of Daily Living No help needed   Instrumental Activities of Daily Living No " help needed   Mini Cog Total Score 5   Some recent data might be hidden     A Mini-Cog score of 0-2 suggests the possibility of dementia, score of 3-5 suggests no dementia    Identified Health Risks:     She is at risk for lack of exercise and has been provided with information to increase physical activity for the benefit of her well-being.  The patient was counseled and encouraged to consider modifying their diet and eating habits. She was provided with information on recommended healthy diet options.  The patient reports that she does not have all recommended working emergency equipment available. She was provided with information about emergency preparedness, including smoke detectors.  The patient was provided with written information regarding signs of hearing loss.  Information on urinary incontinence and treatment options given to patient.  Patient's advanced directive was discussed and I am comfortable with the patient's wishes.

## 2021-06-21 NOTE — PROGRESS NOTES
Clinic Note    Assessment:     Assessment and Plan:  1. Essential hypertension  Blood pressure well controlled today on Maxide.  138/82.  Patient is asymptomatic on the new medication.  Plan to recheck a metabolic panel today to reassess potassium levels.  She would like us to mail her her lab results when they result.  - Basic Metabolic Panel       Patient Instructions   Your blood pressure looks well today.  Let us plan on having you follow-up with either I or Dr. Neetu Regan in 3 months for routine check.    In the meantime, we will recheck your potassium levels today.    I will mail you the results when they come back.    Return in about 3 months (around 1/24/2019).         Subjective:      Patient comes the clinic today for follow-up of her hypertension.    I last saw the patient approximately 3 weeks ago.  At that time her blood pressure was uncontrolled on chlorthalidone 50 mg.  We had her discontinue the chlorthalidone and start triamterene hydrochlorothiazide to help manage her hypokalemia.  Past metabolic panel showed potassium levels to be hovering around 3.3.    Today, she took her medication prior to coming to her appointment.  Blood pressure is 138/82.  Other vitals are normal.  Weight is stable.  She is asymptomatic in the exam room today, still thinks that she is having a little bit of swelling in her upper extremities    The following portions of the patient's history were reviewed and updated as appropriate: Allergies, medications, problem list, prior note.     Review of Systems:    Review is otherwise negative except for what is mentioned above.     Social Hx:    History   Smoking Status     Never Smoker   Smokeless Tobacco     Never Used         Objective:     Vitals:    10/24/18 0910   BP: 138/82   Pulse: 98   SpO2: 96%   Weight: 147 lb (66.7 kg)       Exam:    General: No apparent distress. Calm. Alert and Oriented X3. Pt behavior is appropriate.  Chest/Lungs: Normal chest wall, clear to  auscultation, normal respiratory effort and rate.   Heart/Pulses: Regular rate and rhythm, strong and equal radial pulses, no murmurs. Capillary refill <2 seconds. No edema.   Neurologic: Interactive, alert, no focal findings, CNs intact.   Skin: Warm, dry. Normal hair pattern. Free of lesions. Normal skin turgor.       Patient Active Problem List   Diagnosis     Allergic Rhinitis     Anxiety     GERD (gastroesophageal reflux disease)     Osteoporosis     Hypothyroidism     Vitamin D Deficiency     Hyperlipidemia     Hypertension     Respiratory failure with hypoxia (H)     Current Outpatient Prescriptions   Medication Sig Dispense Refill     azelastine (ASTELIN) 137 mcg (0.1 %) nasal spray 1 spray into each nostril 2 (two) times a day. Use in each nostril as directed 30 mL 12     cholecalciferol, vitamin D3, 1,000 unit tablet Take 5,000 Units by mouth daily.        lactulose 10 gram/15 mL solution Take 15ml twice daily as needed for constipation 240 mL 0     levothyroxine (SYNTHROID, LEVOTHROID) 75 MCG tablet TAKE 1 TABLET BY MOUTH DAILY AS DIRECTED 90 tablet 2     omeprazole (PRILOSEC) 20 MG capsule TAKE 1 CAPSULE BY MOUTH TWICE DAILY 180 capsule 2     rosuvastatin (CRESTOR) 10 MG tablet TAKE ONE TABLET BY MOUTH AT BEDTIME 90 tablet 2     rosuvastatin (CRESTOR) 20 MG tablet TAKE 1 TABLET (20 MG TOTAL) BY MOUTH BEDTIME. 90 tablet 2     triamterene-hydrochlorothiazide (MAXZIDE-25) 37.5-25 mg per tablet Take 1 tablet by mouth daily. 90 tablet 1     No current facility-administered medications for this visit.        I spent 25 minutes with patient face to face, of which >50% was counseling regarding the above plan       Mio Tabares (Rob), ANNA    10/24/2018

## 2021-06-22 NOTE — PROGRESS NOTES
Assessment and Plan:Chanel Regan is a 73 y.o. female with a past medical history significant for presumed organizing pneumonia who presents to clinic today for a one year checkup.  She has no symptoms or suggestion that her process has returned.  She works diligently on her pulmonary health with breathing exercises.  She likely has some fibrosis after her initial insult, and this is certainly helping restore her lung capacity.  She is more than two years away from her last need of prednisone, I doubt it will come back at this point.  As she measures her own vital capacity daily, and her sats, I suspect she may know if this is coming back before she feels it.      1) Organizing pneumonia - resolved.  If she redevelops symptoms, or notices desaturation or decreased incentive spirometry, we should see her again.  Otherwise she does not need continued pulmonary follow up  2) Cough - PND, try more nasal washes and continue the astelin          CCx: organizing pneumonia    HPI: Ms. Regan is a 73 year old female with a history of organizing pneumonia who returns for a one year follow up.  Since I saw her last, she has had no problems with her lungs.  She uses an incentive spirometer everyday, and checks her sats as a part of a routine health monitoring.  She is not limited by breathing.  She has a slight tickle cough, which she is convinced is nasal drainage.  She uses astelin and nasal washes for this.    Otherwise her health has been good.    ROS:  Review of Systems - History obtained from the patient  General ROS: negative  Psychological ROS: negative  ENT ROS: negative  Allergy and Immunology ROS: negative  Endocrine ROS: negative  Respiratory ROS: positive for - cough  negative for - hemoptysis, orthopnea, pleuritic pain, shortness of breath, sputum changes, stridor, tachypnea or wheezing  Cardiovascular ROS: no chest pain or palpitations  Gastrointestinal ROS: frequent GI upset and diarrhea  Genito-Urinary  ROS: no dysuria, trouble voiding, or hematuria  Musculoskeletal ROS: negative  Neurological ROS: no TIA or stroke symptoms  Dermatological ROS: negative      Current Meds:  Current Outpatient Medications   Medication Sig     azelastine (ASTELIN) 137 mcg (0.1 %) nasal spray 1 spray into each nostril 2 (two) times a day. Use in each nostril as directed     cholecalciferol, vitamin D3, 1,000 unit tablet Take 5,000 Units by mouth daily.      lactulose 10 gram/15 mL solution Take 15ml twice daily as needed for constipation     levothyroxine (SYNTHROID, LEVOTHROID) 75 MCG tablet TAKE 1 TABLET BY MOUTH DAILY AS DIRECTED     omeprazole (PRILOSEC) 20 MG capsule TAKE 1 CAPSULE BY MOUTH TWICE DAILY     ondansetron (ZOFRAN) 4 MG tablet Take 1 tablet (4 mg total) by mouth daily as needed for nausea.     rosuvastatin (CRESTOR) 10 MG tablet TAKE ONE TABLET BY MOUTH AT BEDTIME     rosuvastatin (CRESTOR) 20 MG tablet TAKE 1 TABLET (20 MG TOTAL) BY MOUTH BEDTIME.     triamterene-hydrochlorothiazide (MAXZIDE-25) 37.5-25 mg per tablet Take 1 tablet by mouth daily.       Labs:  No results found for this or any previous visit (from the past 72 hour(s)).    I have personally reviewed all pertinent imaging studies and PFT results unless otherwise noted.    Imaging studies:  Us Pelvis With Transvaginal Non Ob    Result Date: 3/15/2018  US PELVIS WITH TRANSVAGINAL NON OB 3/15/2018 11:32 AM INDICATION: Left lower quadrant pain TECHNIQUE: Transabdominal scans were performed. Endovaginal ultrasound was performed to better visualize the adnexa. COMPARISON: None. FINDINGS: Uterus measures 6.3 x 5.3 x 2.2 cm. Normal uterus with no masses. Endometrial thickness is 3 mm. Normal smooth endometrium.  Right ovary obscured by bowel gas. Left ovary obscured by bowel gas. No significant free fluid in the cul-de-sac.     CONCLUSION: 1.  Normal uterus and endometrium. 2.  Ovaries obscured by bowel gas.        Physical Exam:  /82   Pulse (!) 103    "Resp 16   Ht 5' 1\" (1.549 m)   Wt 150 lb (68 kg)   SpO2 97%   Breastfeeding? No   BMI 28.34 kg/m    General - Well nourished  Ears/Mouth -  OP pink moist, no thrush  Neck - no cervical lymphadenopathy  Lungs - Clear to ausculation bilaterally   CVS - regular rhythm with no murmurs, rubs or gallups  Abdomen - soft, NT, ND, NABS  Ext - no cyanosis, clubbing or edema  Skin - no rash  Psychology - alert and oriented, answers appropriate        Electronically signed by:    Alvaro Burnett MD PhD  [facility]  "

## 2021-06-24 NOTE — TELEPHONE ENCOUNTER
RN cannot approve Refill Request    RN can NOT refill this medication the last prescription was written in 2017,deferred to provider.     Bird Sneed, [facility] Triage/Med Refill 3/12/2019    Requested Prescriptions   Pending Prescriptions Disp Refills     omeprazole (PRILOSEC) 20 MG capsule [Pharmacy Med Name: OMEPRAZOLE 20MG CAP  CAPSULE] 180 capsule      Sig: TAKE 1 CAPSULE BY MOUTH TWICE DAILY    GI Medications Refill Protocol Passed - 3/6/2019  3:48 PM       Passed - PCP or prescribing provider visit in last 12 or next 3 months.    Last office visit with prescriber/PCP: 3/8/2018 Neetu Regan MD OR same dept: 10/24/2018 Mio Tabares CNP OR same specialty: 10/24/2018 Mio Tabares CNP  Last physical: 11/13/2018 Last MTM visit: Visit date not found   Next visit within 3 mo: Visit date not found  Next physical within 3 mo: Visit date not found  Prescriber OR PCP: Neetu Regan MD  Last diagnosis associated with med order: There are no diagnoses linked to this encounter.  If protocol passes may refill for 12 months if within 3 months of last provider visit (or a total of 15 months).

## 2021-06-25 NOTE — TELEPHONE ENCOUNTER
Refill Approved    Rx renewed per Medication Renewal Policy. Medication was last renewed on 6/20/18    Shahram Knight, [facility] Triage/Med Refill 3/17/2019     Requested Prescriptions   Pending Prescriptions Disp Refills     levothyroxine (SYNTHROID, LEVOTHROID) 75 MCG tablet [Pharmacy Med Name: LEVOTHYROXINE 75MCG TAB  TABLET] 90 tablet 2     Sig: TAKE 1 TABLET BY MOUTH DAILY AS DIRECTED    Thyroid Hormones Protocol Passed - 3/13/2019  3:12 PM       Passed - Provider visit in past 12 months or next 3 months    Last office visit with prescriber/PCP: 3/8/2018 Neetu Regan MD OR same dept: 10/24/2018 Mio Tabares CNP OR same specialty: 10/24/2018 Mio Tabares CNP  Last physical: 11/13/2018 Last MTM visit: Visit date not found   Next visit within 3 mo: Visit date not found  Next physical within 3 mo: Visit date not found  Prescriber OR PCP: Neetu Regan MD  Last diagnosis associated with med order: 1. Hypothyroid  - levothyroxine (SYNTHROID, LEVOTHROID) 75 MCG tablet [Pharmacy Med Name: LEVOTHYROXINE 75MCG TAB  TABLET]; TAKE 1 TABLET BY MOUTH DAILY AS DIRECTED  Dispense: 90 tablet; Refill: 2    If protocol passes may refill for 12 months if within 3 months of last provider visit (or a total of 15 months).            Passed - TSH on file in past 12 months for patient age 12 & older    TSH   Date Value Ref Range Status   10/02/2018 1.24 0.30 - 5.00 uIU/mL Final

## 2021-06-25 NOTE — PROGRESS NOTES
Progress Notes by Alvaro Burnett MD at 12/4/2017  8:30 AM     Author: Alvaro Burnett MD Service: -- Author Type: Physician    Filed: 12/4/2017  9:13 AM Encounter Date: 12/4/2017 Status: Signed    : Alvaro Burnett MD (Physician)       CCx:  follow up and chronic cough    HPI: Chanel Regan is a 72 year old female with a history of presumed organizing pneumonia who returns for follow up.  Her dyspnea is substantially better and she continues to be diligent with her breathing exercises and using her incentive spirometry to build up her lung strength.  She has a nagging chronic cough.  She thinks it is her lisinopril as she has been coughing ever since she was put on this.  She saw an ENT who did not find inflammed sinuses.  She has noticed increased swelling in her wrists and legs.      ROS:  Review of Systems - History obtained from the patient  General ROS: negative  Psychological ROS: negative  ENT ROS: negative  Allergy and Immunology ROS: negative  Endocrine ROS: negative  Respiratory ROS: positive for - cough  negative for - shortness of breath, sputum changes or stridor  Cardiovascular ROS: no chest pain or palpitations  Gastrointestinal ROS: no abdominal pain, change in bowel habits, or black or bloody stools  Genito-Urinary ROS: no dysuria, trouble voiding, or hematuria  Musculoskeletal ROS: increasing swelling  Neurological ROS: no TIA or stroke symptoms  Dermatological ROS: negative      Current Meds:  Current Outpatient Prescriptions   Medication Sig   ? azelastine (ASTELIN) 137 mcg (0.1 %) nasal spray 1 spray into each nostril 2 (two) times a day. Use in each nostril as directed   ? cholecalciferol, vitamin D3, 1,000 unit tablet Take 5,000 Units by mouth daily.    ? Lactobacillus rhamnosus GG (CULTURELLE) 10-15 Billion cell capsule Take 1 capsule by mouth daily.   ? lactulose 10 gram/15 mL solution Take 15ml twice daily as needed for constipation   ? levothyroxine  (SYNTHROID, LEVOTHROID) 75 MCG tablet TAKE 1 TABLET BY MOUTH DAILY AS DIRECTED   ? lisinopril (PRINIVIL,ZESTRIL) 10 MG tablet Take 1 tablet (10 mg total) by mouth daily.   ? omeprazole (PRILOSEC) 20 MG capsule TAKE 1 CAPSULE BY MOUTH TWICE DAILY   ? rosuvastatin (CRESTOR) 10 MG tablet TAKE ONE TABLET BY MOUTH AT BEDTIME   ? rosuvastatin (CRESTOR) 20 MG tablet TAKE 1 TABLET (20 MG TOTAL) BY MOUTH BEDTIME.   ? vitamin E 400 UNIT capsule Take 400 Units by mouth daily.       Labs:  No results found for this or any previous visit (from the past 72 hour(s)).    I have personally reviewed all pertinent imaging studies and PFT results unless otherwise noted.    Imaging studies:  Xr Chest Pa And Lateral    Result Date: 10/31/2017  XR CHEST PA AND LATERAL 10/31/2017 10:22 AM INDICATION: dyspnea COMPARISON: Chest radiograph dated 4/12/2016 FINDINGS: Again noted are reticular interstitial changes of the bilateral lower lobes which have improved since the prior study. No focal consolidation. No pleural effusion. No pneumothorax. Mild degenerative changes of the lower thoracic spine.          April 12, 2016      Physical Exam:  /80  Pulse (!) 119  Resp 18  Wt 150 lb (68 kg)  SpO2 95% Comment: RA at rest  BMI 28.34 kg/m2     General - Well nourished  Ears/Mouth - TMs clear bilaterally,  OP pink moist, no thrush  Neck - no cervical lymphadenopathy  Lungs - Clear to ausculation bilaterally   CVS - regular rhythm with no murmurs, rubs or gallups  Abdomen - soft, NT, ND, NABS  Ext - no cyanosis, clubbing or edema  Skin - no rash  Psychology - alert and oriented, answers appropriate      Assessment and Plan:Chanel Regan is a 72 y.o. with a past medical history significant for  and cough who presents to clinic today for follow up.  I think her  is completely quiescent.  Her Xray shows a dramatic improvement and I suspect she has residual fibrosis from this, but is not functionally limited.  While this may create a  chronic cough, I would like her to stop the lisinopril first to see if that has an impact.    1) Cough - suspect ACE cough.  Stop lisinopril for 2 weeks and consider an alternate blood pressure med with Dr. Regan.    2)  - likely resolved with residual scarring - will follow up in 1 year as this can recur  3) Hypertension - she had repeated checks over 140/80 - will defer management to Dr. Regan who has been alerted.           Electronically signed by:    Alvaro Burnett MD PhD  [facility] Pulmonary and Critical Care Medicine

## 2021-06-25 NOTE — PROGRESS NOTES
Assessment/Plan:     1. Encounter to establish care  Anticipate physical with fasting labs in November     2. Age related osteoporosis, unspecified pathological fracture presence  Will not repeat a DEXA scan, as patient has an intolerance to Fosamax, and does not wish to continue with Prolia.  She will continue Vitamin D and calcium supplementation.  Encouraged weight bearing exercise.    3. Mixed hyperlipidemia  On a statin.  Tolerating well.    4. Essential hypertension  Well controlled.    5. Hypothyroidism, unspecified type  Asymptomatic on current dose of Levothyroxine.        Subjective:     Chanel Regan is a 73 y.o. female who presents to establish care.  Previously with Dr. Neetu Regan.  Patient denies any concerns today.    Osteoporosis: last DEXA in 2016.  Does not tolerate Fosamax.  She did start Prolia in 2017.  Patient does not wish to continue medication.  She is on calcium and vitamin D.    Hypertension: well controlled on Maxzide.  She is not routinely checking blood pressures at home.    Hyperlipidemia: on Crestor and tolerating well.  Previously has not tolerated other statin.    Hypothyroidism: asymptomatic on current dose of Levothyroxine.     GERD: controlled on Prilosec twice daily.      The following portions of the patient's history were reviewed and updated as appropriate: allergies, current medications, past family history, past medical history, past social history, past surgical history and problem list.    Review of Systems  A comprehensive review of systems was performed and was otherwise negative    Objective:     /82   Pulse 80   Wt 148 lb 6.4 oz (67.3 kg)   BMI 28.04 kg/m      General Appearance: Alert, cooperative, no distress, appears stated age  Lungs: Clear to auscultation bilaterally, respirations unlabored  Heart: Regular rate and rhythm, S1 and S2 normal, no murmur, rub, or gallop     Emili Martinez NP

## 2021-06-25 NOTE — PROGRESS NOTES
Progress Notes by Alvaro Burnett MD at 1/17/2017 11:00 AM     Author: Alvaro Burnett MD Service: -- Author Type: Physician    Filed: 1/17/2017  2:47 PM Encounter Date: 1/17/2017 Status: Signed    : Alvaro Burnett MD (Physician)       CCx: follow up lung process    HPI: Ms Regan returns for follow up.  I see her for a pulmonary process that resulted in significant hypoxia, hospitalization and dyspnea.  It appeared to improve with steroids, but had recurred with tapering.  She is now completing a long taper of her prednisone and has been off of this for a couple of weeks. She feels great.  She isn't really short of breath anymore and is doing breathing exercises everyday with balloons.  She does have a dry cough she thinks is coming from her sinuses.  She is worried about her lung CT and her pulmonary function tests.    ROS:  Review of Systems - History obtained from the patient  General ROS: negative  Psychological ROS: negative  ENT ROS: negative  Allergy and Immunology ROS: negative  Endocrine ROS: negative  Respiratory ROS: positive for - cough  negative for - shortness of breath, sputum changes or wheezing  Cardiovascular ROS: no chest pain or palpitations  Gastrointestinal ROS: no abdominal pain, change in bowel habits, or black or bloody stools  Genito-Urinary ROS: no dysuria, trouble voiding, or hematuria  Musculoskeletal ROS: negative  Neurological ROS: no TIA or stroke symptoms  Dermatological ROS: negative      Current Meds:  Current Outpatient Prescriptions   Medication Sig   ? aspirin 81 MG EC tablet Take 81 mg by mouth daily.   ? B&C-FA-zinc-copper oxide-vit e (STRESS B-COMPLEX) 500-400-23.9-3 mg-mcg-mg-mg Tab Take 1 tablet by mouth daily.   ? chlorthalidone (HYGROTEN) 50 MG tablet Take 50 mg by mouth daily.   ? cholecalciferol, vitamin D3, 1,000 unit tablet Take 5,000 Units by mouth daily.    ? Lactobacillus rhamnosus GG (CULTURELLE) 10-15 Billion cell capsule Take  1 capsule by mouth daily.   ? lactulose 10 gram/15 mL solution Take 15ml twice daily as needed for constipation   ? levothyroxine (SYNTHROID, LEVOTHROID) 75 MCG tablet TAKE 1 TABLET BY MOUTH DAILY AS DIRECTED   ? omeprazole (PRILOSEC) 20 MG capsule TAKE 1 CAPSULE BY MOUTH TWICE DAILY   ? potassium chloride SA (K-DUR,KLOR-CON) 20 MEQ tablet Take 1 tablet (20 mEq total) by mouth 2 (two) times a day.   ? predniSONE (DELTASONE) 1 MG tablet When done with your 5mg tablets, take four of these for a week, then three for a week, then two for a week, then one table for a week.   ? predniSONE (DELTASONE) 5 MG tablet Take 1 tablet (5 mg total) by mouth daily.   ? rosuvastatin (CRESTOR) 20 MG tablet Take 1 tablet (20 mg total) by mouth bedtime.   ? VENTOLIN HFA 90 mcg/actuation inhaler Inhale 2 puffs every 6 (six) hours as needed for wheezing.   ? vitamin E 400 UNIT capsule Take 400 Units by mouth daily.       Labs:  Recent Results (from the past 72 hour(s))   POCT hemoglobin   Result Value Ref Range    Hgb 13.8 7.0 g/dL       I have personally reviewed all pertinent imaging studies and PFT results unless otherwise noted.    FEV1/FVC is 85% and is normal.  FEV1 is 1.65L (84%) predicted and is normal.  FVC is 1.94L (75%) predicted and reduced.  There was no improvement in spirometry after a single inhaled dose of bronchodilator.  TLC is 3.22L (75%) predicted and is reduced.  RV is 0.99L (50%) predicted and is reduced.  DLCO is 9.75ml/min/hg (50%) predicted and is reduced when it is corrected for hemoglobin.  Flow volume loops indicate a restrictive pattern.    Impression:  Full Pulmonary Function Test is abnormal.  PFTs are consistent with mild restrictive disease.  Spirometry is not consistent with reversibility.  There is no hyperinflation.  There is no air-trapping.  Diffusion capacity when corrected for hemoglobin is moderately reduced.    Alvaro Burnett  [facility]        Imaging studies:  08/31/16      Today    Physical  Exam:  Visit Vitals   ? /82   ? Pulse 84   ? Resp 16   ? Wt 142 lb 6.4 oz (64.6 kg)   ? SpO2 97%  Comment: RA   ? BMI 26.91 kg/m2     General - Well nourished  Ears/Mouth - TMs clear bilaterally,  OP pink moist, no thrush  Neck - no cervical lymphadenopathy  Lungs - Clear to ausculation bilaterally, somewhat diminished airflow  CVS - regular rhythm with no murmurs, rubs or gallups  Abdomen - soft, NT, ND, NABS  Ext - no cyanosis, clubbing or edema  Skin - no rash  Psychology - alert and oriented, answers appropriate      Assessment and Plan:Chanel Regan is a 71 y.o. with a past medical history significant for a steroid responsive lung process.  Initially we were under the presumption it was cryptogenic organizing pneumonia.  Her PFTs suggest a restrictive process.  This could also represent NSIP.  Regardless, she seems to have recovered functional capacity and is now of prednisone.  If this recurs, I recommend a biopsy.  She still has a cough, but I don't think this is coming from her lungs, but maybe sinus drainage.  1. /NSIP - CT today shows a gradual resolution of her infiltrates in all aspects.  Her oxygen sats are restored.  Her PFTs show a mild restrictive process with a diffusion capacity impairment.  I think we are safe to watch her from here off of prednisone and monitor for any recurrence.  There is not a role for repeating PFTs or CT unless symptoms recur.  2. Cough - seems post nasal, not responding to flonase.  Will add astelin and encourage more aggressive use of nasal rinses.    3. RTC in 3 months to monitor symptoms - can spread out to longer intervals if no new developments.      Alvaro Burnett MD PhD  [facility] Pulmonary and Critical Care Medicine

## 2021-06-27 NOTE — PROGRESS NOTES
Progress Notes by Nora Randolph CNP at 3/11/2019 11:20 AM     Author: Nora Randolph CNP Service: -- Author Type: Nurse Practitioner    Filed: 3/13/2019 11:56 AM Encounter Date: 3/11/2019 Status: Signed    : Nora Randolph CNP (Nurse Practitioner)       Chief Complaint   Patient presents with   ? Arm Pain     left arm x2 days   ? Shoulder Pain   ? Other     heaviness in shoulder and neck       ASSESSMENT & PLAN:   Diagnoses and all orders for this visit:    Pain of left upper arm        MDM:  No evidence of this is related to her heart is she has no associated symptoms and does not vary with activity.  Is quite tender over the bicep muscle as well as trapezius muscle.  Patient given reassurance.  Discussed signs and symptoms of angina or heart disease.      Supportive care discussed.  See discharge instructions below for specific recommendations given.    At the end of the encounter, I discussed results, diagnosis, medications. Discussed red flags for immediate return to clinic/ER, as well as indications for follow up if no improvement. Patient and/or caregiver understood and agreed to plan. Patient was stable for discharge.    SUBJECTIVE    HPI:  C/O heaviness on left upper arm and neck pain x 2 days.  Started after going to [employer] [employer] and carrying purse around.  Worried she has a heart issue.  Hx of anxiety.      First noticed while at home.  Has been tired, but only sleeps 5 hours a night.  Will be a little shortness of breath with going up the stairs.              History obtained from the patient.    Past Medical History:   Diagnosis Date   ? Anxiety    ? GERD (gastroesophageal reflux disease)    ? Hemorrhoids    ? Hyperlipidemia    ? Hypertension    ? Hypothyroidism    ? Osteoporosis    ? Pneumonia        Problem List:  2016-03: Hypertension  2016-03: Hypothyroidism  2016-03: Respiratory failure with hypoxia (H)  2016-03: Pneumonia, organism unspecified(486)  2016-03: CAP (community acquired  pneumonia)  2016-03: Acute respiratory failure with hypoxemia (H)  Allergic Rhinitis  Overweight  Anxiety  Acne  Hypercalcemia  Hemorrhoids  GERD (gastroesophageal reflux disease)  Osteoporosis  Hypothyroidism  Vitamin D Deficiency  Hyperlipidemia  Essential (primary) hypertension  Hypoxia  Lung infiltrate      Social History     Tobacco Use   ? Smoking status: Never Smoker   ? Smokeless tobacco: Never Used   Substance Use Topics   ? Alcohol use: No       Review of Systems   Constitutional: Positive for fatigue. Negative for chills and fever.   HENT: Negative for congestion, ear pain and sore throat.    Respiratory: Positive for shortness of breath (see HPI ). Negative for cough.    Cardiovascular: Negative for chest pain and palpitations.   Gastrointestinal: Negative for nausea and vomiting.   Musculoskeletal: Positive for neck stiffness (upper back ).   Skin: Negative for rash.   Neurological: Positive for numbness (left face normally ). Negative for dizziness, weakness and light-headedness.       OBJECTIVE    Vitals:    03/11/19 1129 03/11/19 1134   BP: 148/84 140/90   Patient Site: Right Arm Right Arm   Patient Position: Sitting Sitting   Cuff Size: Adult Regular Adult Large   Pulse: 97    Resp: 16    Temp: 97.5  F (36.4  C)    TempSrc: Oral    SpO2: 98%    Weight: 147 lb (66.7 kg)        Physical Exam   Constitutional: She is oriented to person, place, and time. She appears well-developed and well-nourished. No distress.   HENT:   Right Ear: External ear normal.   Left Ear: External ear normal.   Eyes: Conjunctivae are normal. Right eye exhibits no discharge. Left eye exhibits no discharge.   Cardiovascular: Normal rate, regular rhythm, normal heart sounds and intact distal pulses.   No murmur heard.  Pulmonary/Chest: Effort normal.   Musculoskeletal: Normal range of motion. She exhibits tenderness (bicep, left trapezius ).   Neurological: She is alert and oriented to person, place, and time.   Skin: Skin is  warm and dry. Capillary refill takes less than 2 seconds.   Psychiatric: She has a normal mood and affect. Her behavior is normal. Judgment and thought content normal.       Labs:  No results found for this or any previous visit (from the past 240 hour(s)).      Radiology:    No results found.    PATIENT INSTRUCTIONS:   Patient Instructions   Ice your neck and arm today    Aleve 1 tab twice daily until better.      Warms packs starting tomorrow.     Avoid heavy lifting with left arm.        Patient Education     Muscle Strain in the Extremities  A muscle strain is a stretching and tearing of muscle fibers. This causes pain, especially when you move that muscle. There may also be some swelling and bruising.  Home care    Keep the hurt area raised to reduce pain and swelling. This is especially important during the first 48 hours.    Apply an ice pack over the injured area for 15 to 20 minutes every 3 to 6 hours. You should do this for the first 24 to 48 hours. You can make an ice pack by filling a plastic bag that seals at the top with ice cubes and then wrapping it with a thin towel. Be careful not to injure your skin with the ice treatments. Ice should never be applied directly to skin. Continue the use of ice packs for relief of pain and swelling as needed. After 48 hours, apply heat (warm shower or warm bath) for 15 to 20 minutes several times a day, or alternate ice and heat.    You may use over-the-counter pain medicine to control pain, unless another medicine was prescribed. If you have chronic liver or kidney disease or ever had a stomach ulcer or GI bleeding, talk with your healthcare provider before using these medicines.    For leg strains: If crutches have been recommended, dont put full weight on the hurt leg until you can do so without pain. You can return to sports when you are able to hop and run on the injured leg without pain.  Follow-up care  Follow up with your healthcare provider, or as  advised.  When to seek medical advice  Call your healthcare provider right away if any of these occur:    The toes of the injured leg become swollen, cold, blue, numb, or tingly    Pain or swelling increases  Date Last Reviewed: 11/19/2015 2000-2017 [employer]. [address] [place] [id number]. All rights reserved. This information is not intended as a substitute for professional medical care. Always follow your healthcare professional's instructions.

## 2021-06-27 NOTE — PROGRESS NOTES
Progress Notes by Rashel Lowry PA-C at 5/21/2019  4:00 PM     Author: Rashel Lowry PA-C Service: -- Author Type: Physician Assistant    Filed: 5/21/2019  6:25 PM Encounter Date: 5/21/2019 Status: Addendum    : Rashel Lowry PA-C (Physician Assistant)    Related Notes: Original Note by Rashel Lowry PA-C (Physician Assistant) filed at 5/21/2019  6:23 PM       Subjective:      Patient ID: Chanel Regan is a 73 y.o. female.    Chief Complaint:    HPI  Chanel Regan is a 73 y.o. female who presents today complaining of a three day acute onset of sore throat and odynophagia low-grade subjective fever, cough left ear pain that radiates along the angle of the jaw to the back of the throat, headache and nasal congestion.      Patient denies chills, night sweats, fatigue, vomiting, diarrhea, skin rash, abdominal pain or urinary symptoms.      No known sick contacts for strep throat.    Has tried over-the-counter treatment with NyQuil and DayQuil.  She did have DayQuil today and her pulse is fast in the office.    Past Medical History:   Diagnosis Date   ? Anxiety    ? GERD (gastroesophageal reflux disease)    ? Hemorrhoids    ? Hyperlipidemia    ? Hypertension    ? Hypothyroidism    ? Osteoporosis    ? Pneumonia        Past Surgical History:   Procedure Laterality Date   ? NO PAST SURGERIES         Family History   Problem Relation Age of Onset   ? Breast cancer Sister    ? Osteoporosis Sister    ? Lung cancer Sister    ? COPD Sister        Social History     Tobacco Use   ? Smoking status: Never Smoker   ? Smokeless tobacco: Never Used   Substance Use Topics   ? Alcohol use: No   ? Drug use: Not on file       Review of Systems  As above in HPI, otherwise balance of Review of Systems are negative.    Objective:     /78 (Patient Site: Right Arm, Patient Position: Sitting, Cuff Size: Adult Regular)   Pulse (!) 108   Temp 98.8  F (37.1  C) (Oral)   Resp 18   Wt 151 lb (68.5 kg)   SpO2 98%    Breastfeeding? No   BMI 28.53 kg/m      Physical Exam  General: Patient is resting comfortably no acute distress is afebrile  HEENT: Head is normocephalic atraumatic   eyes are PERRL EOMI sclera anicteric   TMs are with fluid in the middle ears bilaterally  Throat is with mild pharyngeal wall erythema and no exudate  No cervical lymphadenopathy present  LUNGS: Clear to auscultation bilaterally  HEART: Regular rate and rhythm  Skin: Without rash non-diaphoretic    Lab:  Recent Results (from the past 24 hour(s))   Rapid Strep A Screen-Throat   Result Value Ref Range    Rapid Strep A Antigen No Group A Strep detected, presumptive negative No Group A Strep detected, presumptive negative       Assessment:     Procedures    The primary encounter diagnosis was Throat pain. A diagnosis of Viral syndrome was also pertinent to this visit.    Plan:     1. Throat pain  Rapid Strep A Screen-Throat    Group A Strep, RNA Direct Detection, Throat   2. Viral syndrome           Patient Instructions     Suggested increased rest increased fluids and bedside humidification  Over-the-counter Tylenol for comfort.  Follow packaging directions  Over-the-counter throat lozenges with benzocaine such as Cepacol may be used if indicated and is not a choking hazard based on age.  Follow packaging directions.  Do not overuse the benzocaine as it will dry the throat and make it uncomfortable.  Follow up with primary care provider if you do not get resolution with the course of treatment.  Return to walk-in care if complication or new symptoms arise in the interim.      Self-Care for Sore Throats  Sore throats happen for many reasons, such as colds, allergies, and infections caused by viruses or bacteria. In any case, your throat becomes red and sore. Your goal for self-care is to reduce your discomfort while giving your throat a chance to heal.    Moisten and soothe your throat  Tips include the following:    Try a sip of water first thing after  waking up.    Keep your throat moist by drinking 6 or more glasses of clear liquids every day.    Run a cool-air humidifier in your room overnight.    Avoid cigarette smoke.     Suck on throat lozenges, cough drops, hard candy, ice chips, or frozen fruit-juice bars. Use the sugar-free versions if your diet or medical condition requires them.  Gargle to ease irritation  Gargling every hour or 2 can ease irritation. Try gargling with 1 of these solutions:    1/4 teaspoon of salt in 1/2 cup of warm water    An over-the-counter anesthetic gargle  Use medicine for more relief  Over-the-counter medicine can reduce sore throat symptoms. Ask your pharmacist if you have questions about which medicine to use:    Ease pain with anesthetic sprays. Aspirin or an aspirin substitute also helps. Remember, never give aspirin to anyone 18 or younger, or if you are already taking blood thinners.     For sore throats caused by allergies, try antihistamines to block the allergic reaction.    Remember: unless a sore throat is caused by a bacterial infection, antibiotics wont help you.  Prevent future sore throats  Prevention tips include the following:    Stop smoking or reduce contact with secondhand smoke. Smoke irritates the tender throat lining.    Limit contact with pets and with allergy-causing substances, such as pollen and mold.    When youre around someone with a sore throat or cold, wash your hands often to keep viruses or bacteria from spreading.    Dont strain your vocal cords.  Call your healthcare provider  Contact your healthcare provider if you have:    A temperature over 101 F (38.3 C)    White spots on the throat    Great difficulty swallowing    Trouble breathing    A skin rash    Recent exposure to someone else with strep bacteria    Severe hoarseness and swollen glands in the neck or jaw   Date Last Reviewed: 8/1/2016 2000-2016 [employer]. [address] [place] [address], [place] [address]. All rights  "reserved. This information is not intended as a substitute for professional medical care. Always follow your healthcare professional's instructions.          Patient Education     Viral Syndrome (Adult)  A viral illness may cause a number of symptoms. The symptoms depend on the part of the body that the virus affects. If it settles in your nose, throat, and lungs, it may cause cough, sore throat, congestion, and sometimes headache. If it settles in your stomach and intestinal tract, it may cause vomiting and diarrhea. Sometimes it causes vague symptoms like \"aching all over,\" feeling tired, loss of appetite, or fever.  A viral illness usually lasts 1 to 2 weeks, but sometimes it lasts longer. In some cases, a more serious infection can look like a viral syndrome in the first few days of the illness. You may need another exam and additional tests to know the difference. Watch for the warning signs listed below.  Home care  Follow these guidelines for taking care of yourself at home:    If symptoms are severe, rest at home for the first 2 to 3 days.    Stay away from cigarette smoke - both your smoke and the smoke from others.    You may use over-the-counter acetaminophen or ibuprofen for fever, muscle aching, and headache, unless another medicine was prescribed for this. If you have chronic liver or kidney disease or ever had a stomach ulcer or GI bleeding, talk with your doctor before using these medicines. No one who is younger than 18 and ill with a fever should take aspirin. It may cause severe disease or death.    Your appetite may be poor, so a light diet is fine. Avoid dehydration by drinking 8 to 12 8-ounce glasses of fluids each day. This may include water; orange juice; lemonade; apple, grape, and cranberry juice; clear fruit drinks; electrolyte replacement and sports drinks; and decaffeinated teas and coffee. If you have been diagnosed with a kidney disease, ask your doctor how much and what types of " fluids you should drink to prevent dehydration. If you have kidney disease, drinking too much fluid can cause it build up in the your body and be dangerous to your health.    Over-the-counter remedies won't shorten the length of the illness but may be helpful for cough, sore throat; and nasal and sinus congestion. Don't use decongestants if you have high blood pressure.  Follow-up care  Follow up with your healthcare provider if you do not improve over the next week.  Call 911  Call 911 if any of the following occur:    Convulsion    Feeling weak, dizzy, or like you are going to faint    Chest pain, shortness of breath, wheezing, or difficulty breathing  When to seek medical advice  Call your healthcare provider right away if any of these occur:    Cough with lots of colored sputum (mucus) or blood in your sputum    Chest pain, shortness of breath, wheezing, or difficulty breathing    Severe headache; face, neck, or ear pain    Severe, constant pain in the lower right side of your belly (abdominal)    Continued vomiting (cant keep liquids down)    Frequent diarrhea (more than 5 times a day); blood (red or black color) or mucus in diarrhea    Feeling weak, dizzy, or like you are going to faint    Extreme thirst    Fever of 100.4 F (38 C) or higher, or as directed by your healthcare provider  Date Last Reviewed: 9/25/2015 2000-2017 [employer]. [address] [place] [address], [place] [address]. All rights reserved. This information is not intended as a substitute for professional medical care. Always follow your healthcare professional's instructions.

## 2021-07-03 NOTE — ADDENDUM NOTE
Addendum Note by Emili Martinez NP at 1/20/2020  1:00 PM     Author: Emili Martinez NP Service: -- Author Type: Nurse Practitioner    Filed: 1/21/2020  1:11 PM Encounter Date: 1/20/2020 Status: Signed    : Emili Martinez NP (Nurse Practitioner)    Addended by: EMILI MARTINEZ on: 1/21/2020 01:11 PM        Modules accepted: Orders

## 2021-07-03 NOTE — ADDENDUM NOTE
Addendum Note by Neetu Regan at 3/1/2017  5:07 PM     Author: Neetu Regan Service: -- Author Type: Physician    Filed: 3/1/2017  5:07 PM Encounter Date: 2/28/2017 Status: Signed    : Neetu Regan    Addended by: NEETU REGAN on: 3/1/2017 05:07 PM        Modules accepted: Orders

## 2021-07-03 NOTE — ADDENDUM NOTE
Addendum Note by Beckie Tabares CNP at 4/25/2018  3:50 PM     Author: Beckie Tabares CNP Service: -- Author Type: Nurse Practitioner    Filed: 4/25/2018  3:50 PM Encounter Date: 4/25/2018 Status: Signed    : Beckie Tabares CNP (Nurse Practitioner)    Addended by: BECKIE TABARES on: 4/25/2018 03:50 PM        Modules accepted: Orders

## 2021-07-03 NOTE — ADDENDUM NOTE
Addendum Note by Beckie Tabares CNP at 6/1/2018  8:42 AM     Author: Beckie Tabares CNP Service: -- Author Type: Nurse Practitioner    Filed: 6/1/2018  8:42 AM Encounter Date: 5/30/2018 Status: Signed    : Beckie Tabares CNP (Nurse Practitioner)    Addended by: BECKIE TABARES on: 6/1/2018 08:42 AM        Modules accepted: Orders

## 2021-07-03 NOTE — ADDENDUM NOTE
Addendum Note by Emili Martinez NP at 1/20/2020  1:00 PM     Author: Emili Martinez NP Service: -- Author Type: Nurse Practitioner    Filed: 1/21/2020 11:27 AM Encounter Date: 1/20/2020 Status: Signed    : Emili Martinez NP (Nurse Practitioner)    Addended by: EMILI MARTINEZ on: 1/21/2020 11:27 AM        Modules accepted: Orders

## 2021-07-21 ENCOUNTER — RECORDS - HEALTHEAST (OUTPATIENT)
Dept: ADMINISTRATIVE | Facility: CLINIC | Age: 76
End: 2021-07-21

## 2021-09-21 ENCOUNTER — OFFICE VISIT (OUTPATIENT)
Dept: INTERNAL MEDICINE | Facility: CLINIC | Age: 76
End: 2021-09-21
Payer: COMMERCIAL

## 2021-09-21 ENCOUNTER — HOSPITAL ENCOUNTER (OUTPATIENT)
Dept: MAMMOGRAPHY | Facility: CLINIC | Age: 76
Discharge: HOME | End: 2021-09-21
Attending: INTERNAL MEDICINE | Admitting: INTERNAL MEDICINE
Payer: MEDICARE

## 2021-09-21 VITALS
DIASTOLIC BLOOD PRESSURE: 77 MMHG | SYSTOLIC BLOOD PRESSURE: 140 MMHG | BODY MASS INDEX: 27.17 KG/M2 | HEIGHT: 61 IN | OXYGEN SATURATION: 97 % | HEART RATE: 86 BPM | WEIGHT: 143.9 LBS

## 2021-09-21 DIAGNOSIS — I10 ESSENTIAL HYPERTENSION: ICD-10-CM

## 2021-09-21 DIAGNOSIS — K21.00 GASTROESOPHAGEAL REFLUX DISEASE WITH ESOPHAGITIS WITHOUT HEMORRHAGE: ICD-10-CM

## 2021-09-21 DIAGNOSIS — E03.9 ACQUIRED HYPOTHYROIDISM: ICD-10-CM

## 2021-09-21 DIAGNOSIS — M81.0 AGE-RELATED OSTEOPOROSIS WITHOUT CURRENT PATHOLOGICAL FRACTURE: ICD-10-CM

## 2021-09-21 DIAGNOSIS — Z00.00 ENCOUNTER FOR MEDICARE ANNUAL WELLNESS EXAM: Primary | ICD-10-CM

## 2021-09-21 DIAGNOSIS — Z12.31 VISIT FOR SCREENING MAMMOGRAM: ICD-10-CM

## 2021-09-21 DIAGNOSIS — Z92.29 PERSONAL HISTORY OF OTHER DRUG THERAPY: ICD-10-CM

## 2021-09-21 DIAGNOSIS — R11.0 NAUSEA: ICD-10-CM

## 2021-09-21 DIAGNOSIS — E78.2 MIXED HYPERLIPIDEMIA: ICD-10-CM

## 2021-09-21 LAB
ALBUMIN SERPL-MCNC: 4.3 G/DL (ref 3.5–5)
ALBUMIN UR-MCNC: NEGATIVE MG/DL
ALP SERPL-CCNC: 47 U/L (ref 45–120)
ALT SERPL W P-5'-P-CCNC: 22 U/L (ref 0–45)
ANION GAP SERPL CALCULATED.3IONS-SCNC: 12 MMOL/L (ref 5–18)
APPEARANCE UR: CLEAR
AST SERPL W P-5'-P-CCNC: 22 U/L (ref 0–40)
BILIRUB SERPL-MCNC: 0.7 MG/DL (ref 0–1)
BILIRUB UR QL STRIP: NEGATIVE
BUN SERPL-MCNC: 21 MG/DL (ref 8–28)
CALCIUM SERPL-MCNC: 10.5 MG/DL (ref 8.5–10.5)
CHLORIDE BLD-SCNC: 102 MMOL/L (ref 98–107)
CHOLEST SERPL-MCNC: 162 MG/DL
CO2 SERPL-SCNC: 30 MMOL/L (ref 22–31)
COLOR UR AUTO: YELLOW
CREAT SERPL-MCNC: 0.87 MG/DL (ref 0.6–1.1)
ERYTHROCYTE [DISTWIDTH] IN BLOOD BY AUTOMATED COUNT: 11.9 % (ref 10–15)
FASTING STATUS PATIENT QL REPORTED: YES
GFR SERPL CREATININE-BSD FRML MDRD: 65 ML/MIN/1.73M2
GLUCOSE BLD-MCNC: 93 MG/DL (ref 70–125)
GLUCOSE UR STRIP-MCNC: NEGATIVE MG/DL
HCT VFR BLD AUTO: 46.2 % (ref 35–47)
HDLC SERPL-MCNC: 52 MG/DL
HGB BLD-MCNC: 14.9 G/DL (ref 11.7–15.7)
HGB UR QL STRIP: NEGATIVE
KETONES UR STRIP-MCNC: NEGATIVE MG/DL
LDLC SERPL CALC-MCNC: 61 MG/DL
LEUKOCYTE ESTERASE UR QL STRIP: NEGATIVE
MCH RBC QN AUTO: 29.7 PG (ref 26.5–33)
MCHC RBC AUTO-ENTMCNC: 32.3 G/DL (ref 31.5–36.5)
MCV RBC AUTO: 92 FL (ref 78–100)
NITRATE UR QL: NEGATIVE
PH UR STRIP: 7 [PH] (ref 5–8)
PLATELET # BLD AUTO: 188 10E3/UL (ref 150–450)
POTASSIUM BLD-SCNC: 4 MMOL/L (ref 3.5–5)
PROT SERPL-MCNC: 7.8 G/DL (ref 6–8)
RBC # BLD AUTO: 5.02 10E6/UL (ref 3.8–5.2)
SODIUM SERPL-SCNC: 144 MMOL/L (ref 136–145)
SP GR UR STRIP: 1.02 (ref 1–1.03)
TRIGL SERPL-MCNC: 247 MG/DL
TSH SERPL DL<=0.005 MIU/L-ACNC: 2.09 UIU/ML (ref 0.3–5)
UROBILINOGEN UR STRIP-ACNC: 0.2 E.U./DL
WBC # BLD AUTO: 7.7 10E3/UL (ref 4–11)

## 2021-09-21 PROCEDURE — G0008 ADMIN INFLUENZA VIRUS VAC: HCPCS | Performed by: INTERNAL MEDICINE

## 2021-09-21 PROCEDURE — 80061 LIPID PANEL: CPT | Performed by: INTERNAL MEDICINE

## 2021-09-21 PROCEDURE — 99397 PER PM REEVAL EST PAT 65+ YR: CPT | Mod: 25 | Performed by: INTERNAL MEDICINE

## 2021-09-21 PROCEDURE — 99214 OFFICE O/P EST MOD 30 MIN: CPT | Mod: 25 | Performed by: INTERNAL MEDICINE

## 2021-09-21 PROCEDURE — 80053 COMPREHEN METABOLIC PANEL: CPT | Performed by: INTERNAL MEDICINE

## 2021-09-21 PROCEDURE — 81003 URINALYSIS AUTO W/O SCOPE: CPT | Performed by: INTERNAL MEDICINE

## 2021-09-21 PROCEDURE — 85027 COMPLETE CBC AUTOMATED: CPT | Performed by: INTERNAL MEDICINE

## 2021-09-21 PROCEDURE — 36415 COLL VENOUS BLD VENIPUNCTURE: CPT | Performed by: INTERNAL MEDICINE

## 2021-09-21 PROCEDURE — 77067 SCR MAMMO BI INCL CAD: CPT

## 2021-09-21 PROCEDURE — 90662 IIV NO PRSV INCREASED AG IM: CPT | Performed by: INTERNAL MEDICINE

## 2021-09-21 PROCEDURE — 84443 ASSAY THYROID STIM HORMONE: CPT | Performed by: INTERNAL MEDICINE

## 2021-09-21 PROCEDURE — 82306 VITAMIN D 25 HYDROXY: CPT | Performed by: INTERNAL MEDICINE

## 2021-09-21 RX ORDER — TRIAMTERENE AND HYDROCHLOROTHIAZIDE 75; 50 MG/1; MG/1
1 TABLET ORAL DAILY
Qty: 90 TABLET | Refills: 3 | Status: SHIPPED | OUTPATIENT
Start: 2021-09-21 | End: 2022-01-11

## 2021-09-21 RX ORDER — ROSUVASTATIN CALCIUM 10 MG/1
10 TABLET, COATED ORAL AT BEDTIME
Qty: 90 TABLET | Refills: 3 | Status: SHIPPED | OUTPATIENT
Start: 2021-09-21 | End: 2021-12-02

## 2021-09-21 RX ORDER — ONDANSETRON 4 MG/1
TABLET, FILM COATED ORAL
Qty: 30 TABLET | Refills: 1 | Status: SHIPPED | OUTPATIENT
Start: 2021-09-21 | End: 2023-10-26

## 2021-09-21 RX ORDER — TRIAMTERENE/HYDROCHLOROTHIAZID 37.5-25 MG
1 TABLET ORAL DAILY
Qty: 90 TABLET | Refills: 3 | Status: SHIPPED | OUTPATIENT
Start: 2021-09-21 | End: 2021-12-28

## 2021-09-21 RX ORDER — LEVOTHYROXINE SODIUM 75 UG/1
TABLET ORAL
Qty: 90 TABLET | Refills: 0 | Status: SHIPPED | OUTPATIENT
Start: 2021-09-21 | End: 2021-12-31

## 2021-09-21 ASSESSMENT — ACTIVITIES OF DAILY LIVING (ADL): CURRENT_FUNCTION: NO ASSISTANCE NEEDED

## 2021-09-21 ASSESSMENT — MIFFLIN-ST. JEOR: SCORE: 1076.14

## 2021-09-21 NOTE — PATIENT INSTRUCTIONS
Flu vaccine. Labs today.  We are increasing diuretic dose to 75-50 mg daily. You can take 2 of your old ones that you have at home.  You have osteoporosis and I would suggest treatment with medications for this problem. We can talk about that again in 3 weeks.    Patient Education   Personalized Prevention Plan  You are due for the preventive services outlined below.  Your care team is available to assist you in scheduling these services.  If you have already completed any of these items, please share that information with your care team to update in your medical record.  Health Maintenance Due   Topic Date Due     ANNUAL REVIEW OF HM ORDERS  Never done     COVID-19 Vaccine (1) Never done     Zoster (Shingles) Vaccine (1 of 2) Never done     Diptheria Tetanus Pertussis (DTAP/TDAP/TD) Vaccine (4 - Td or Tdap) 06/22/2020     Flu Vaccine (1) 09/01/2021     FALL RISK ASSESSMENT  09/10/2021       Understanding [employer] MyPlate  The USDA has guidelines to help you make healthy food choices. These are called MyPlate. MyPlate shows the food groups that make up healthy meals using the image of a place setting. Before you eat, think about the healthiest choices for what to put on your plate or in your cup or bowl. To learn more about building a healthy plate, visit www.choosemyplate.gov.    The food groups    Fruits. Any fruit or 100% fruit juice counts as part of the Fruit Group. Fruits may be fresh, canned, frozen, or dried, and may be whole, cut-up, or pureed. Make 1/2 of your plate fruits and vegetables.    Vegetables. Any vegetable or 100% vegetable juice counts as a member of the Vegetable Group. Vegetables may be fresh, frozen, canned, or dried. They can be served raw or cooked and may be whole, cut-up, or mashed. Make 1/2 of your plate fruits and vegetables.    Grains. All foods made from grains are part of the Grains Group. These include wheat, rice, oats, cornmeal, and barley. Grains are often used to make foods such as  bread, pasta, oatmeal, cereal, tortillas, and grits. Grains should be no more than 1/4 of your plate. At least half of your grains should be whole grains.    Protein. This group includes meat, poultry, seafood, beans and peas, eggs, processed soy products (such as tofu), nuts (including nut butters), and seeds. Make protein choices no more than 1/4 of your plate. Meat and poultry choices should be lean or low fat.    Dairy. The Dairy Group includes all fluid milk products and foods made from milk that contain calcium, such as yogurt and cheese. (Foods that have little calcium, such as cream, butter, and cream cheese, are not part of this group.) Most dairy choices should be low-fat or fat-free.    Oils. Oils aren't a food group, but they do contain essential nutrients. However it's important to watch your intake of oils. These are fats that are liquid at room temperature. They include canola, corn, olive, soybean, vegetable, and sunflower oil. Foods that are mainly oil include mayonnaise, certain salad dressings, and soft margarines. You likely already get your daily oil allowance from the foods you eat.  Things to limit  Eating healthy also means limiting these things in your diet:       Salt (sodium). Many processed foods have a lot of sodium. To keep sodium intake down, eat fresh vegetables, meats, poultry, and seafood when possible. Purchase low-sodium, reduced-sodium, or no-salt-added food products at the store. And don't add salt to your meals at home. Instead, season them with herbs and spices such as dill, oregano, cumin, and paprika. Or try adding flavor with lemon or lime zest and juice.    Saturated fat. Saturated fats are most often found in animal products such as beef, pork, and chicken. They are often solid at room temperature, such as butter. To reduce your saturated fat intake, choose leaner cuts of meat and poultry. And try healthier cooking methods such as grilling, broiling, roasting, or baking.  For a simple lower-fat swap, use plain nonfat yogurt instead of mayonnaise when making potato salad or macaroni salad.    Added sugars. These are sugars added to foods. They are in foods such as ice cream, candy, soda, fruit drinks, sports drinks, energy drinks, cookies, pastries, jams, and syrups. Cut down on added sugars by sharing sweet treats with a family member or friend. You can also choose fruit for dessert, and drink water or other unsweetened beverages.     [employer] last reviewed this educational content on 6/1/2020 2000-2021 [employer]. All rights reserved. This information is not intended as a substitute for professional medical care. Always follow your healthcare professional's instructions.          Signs of Hearing Loss      Hearing much better with one ear can be a sign of hearing loss.   Hearing loss is a problem shared by many people. In fact, it is one of the most common health problems, particularly as people age. Most people age 65 and older have some hearing loss. By age 80, almost everyone does. Hearing loss often occurs slowly over the years. So you may not realize your hearing has gotten worse.  Have your hearing checked  Call your healthcare provider if you:    Have to strain to hear normal conversation    Have to watch other people s faces very carefully to follow what they re saying    Need to ask people to repeat what they ve said    Often misunderstand what people are saying    Turn the volume of the television or radio up so high that others complain    Feel that people are mumbling when they re talking to you    Find that the effort to hear leaves you feeling tired and irritated    Notice, when using the phone, that you hear better with one ear than the other  [employer] last reviewed this educational content on 1/1/2020 2000-2021 [employer]. All rights reserved. This information is not intended as a substitute for professional medical care. Always  follow your healthcare professional's instructions.

## 2021-09-21 NOTE — PROGRESS NOTES
"SUBJECTIVE:   Chanel Regan is a 76 year old female who presents for Preventive Visit.      Patient has been advised of split billing requirements and indicates understanding: Yes   Are you in the first 12 months of your Medicare coverage?  No    Healthy Habits:     In general, how would you rate your overall health?  Good    Frequency of exercise:  2-3 days/week    Duration of exercise:  15-30 minutes    Do you usually eat at least 4 servings of fruit and vegetables a day, include whole grains    & fiber and avoid regularly eating high fat or \"junk\" foods?  No    Taking medications regularly:  Yes    Barriers to taking medications:  None    Medication side effects:  Muscle aches    Ability to successfully perform activities of daily living:  No assistance needed    Home Safety:  No safety concerns identified    Hearing Impairment:  Difficulty following a conversation in a noisy restaurant or crowded room, feel that people are mumbling or not speaking clearly, need to ask people to speak up or repeat themselves and difficulty understanding soft or whispered speech    In the past 6 months, have you been bothered by leaking of urine?  No    In general, how would you rate your overall mental or emotional health?  Good      PHQ-2 Total Score: 0    Additional concerns today:  No    Do you feel safe in your environment? Yes    Have you ever done Advance Care Planning? (For example, a Health Directive, POLST, or a discussion with a medical provider or your loved ones about your wishes): Yes, advance care planning is on file.       Fall risk       Cognitive Screening   1) Repeat 3 items (Leader, Season, Table)    2) Clock draw: NORMAL  3) 3 item recall: Recalls 3 objects  Results: NORMAL clock, 1-2 items recalled: COGNITIVE IMPAIRMENT LESS LIKELY    Mini-CogTM Copyright DANII Herndon. Licensed by the author for use in [facility]; reprinted with permission (brian@.[facility]). All rights reserved.      Do you have " sleep apnea, excessive snoring or daytime drowsiness?: no    Reviewed and updated as needed this visit by clinical staff  Tobacco  Allergies  Meds              Reviewed and updated as needed this visit by Provider                Social History     Tobacco Use     Smoking status: Never Smoker     Smokeless tobacco: Never Used   Substance Use Topics     Alcohol use: No     If you drink alcohol do you typically have >3 drinks per day or >7 drinks per week? No    Alcohol Use 9/21/2021   Prescreen: >3 drinks/day or >7 drinks/week? No               Current providers sharing in care for this patient include:   Patient Care Team:  Rusty Mcgowan MD as PCP - General  Rusty Mcgowan MD as Assigned PCP    The following health maintenance items are reviewed in Epic and correct as of today:  Health Maintenance Due   Topic Date Due     ANNUAL REVIEW OF  ORDERS  Never done     COVID-19 Vaccine (1) Never done     ZOSTER IMMUNIZATION (1 of 2) Never done     DTAP/TDAP/TD IMMUNIZATION (4 - Td or Tdap) 06/22/2020     INFLUENZA VACCINE (1) 09/01/2021     FALL RISK ASSESSMENT  09/10/2021     MEDICARE ANNUAL WELLNESS VISIT  09/10/2021             Pertinent mammograms are reviewed under the imaging tab.    Review of Systems  CONSTITUTIONAL: NEGATIVE for fever, chills, change in weight  INTEGUMENTARY/SKIN: NEGATIVE for worrisome rashes, moles or lesions  EYES: NEGATIVE for vision changes or irritation  ENT/MOUTH: NEGATIVE for ear, mouth and throat problems  RESP: NEGATIVE for significant cough or SOB  BREAST: NEGATIVE for masses, tenderness or discharge  CV: NEGATIVE for chest pain, palpitations or peripheral edema  GI: NEGATIVE for nausea, abdominal pain, heartburn, or change in bowel habits  : NEGATIVE for frequency, dysuria, or hematuria  MUSCULOSKELETAL: NEGATIVE for significant arthralgias or myalgia  NEURO: NEGATIVE for weakness, dizziness or paresthesias  ENDOCRINE: NEGATIVE for temperature intolerance, skin/hair  "changes  HEME: NEGATIVE for bleeding problems  PSYCHIATRIC: NEGATIVE for changes in mood or affect    OBJECTIVE:   BP (!) 140/77   Pulse 86   Ht 1.543 m (5' 0.75\")   Wt 65.3 kg (143 lb 14.4 oz)   SpO2 97%   BMI 27.41 kg/m   Estimated body mass index is 27.41 kg/m  as calculated from the following:    Height as of this encounter: 1.543 m (5' 0.75\").    Weight as of this encounter: 65.3 kg (143 lb 14.4 oz).  Physical Exam  General Appearance: Alert, cooperative, no distress, appears stated age.  Head: Normocephalic, without obvious abnormality, atraumatic  Eyes: PERRL, conjunctiva/corneas clear, EOM's intact  Ears: Normal TM's and external ear canals, both ears  Nose: Nares normal, septum midline,mucosa normal, no drainage  Throat: Lips, mucosa, and tongue normal; teeth and gums normal  Neck: Supple, symmetrical, trachea midline, no adenopathy;  thyroid: not enlarged, symmetric, no tenderness/mass/nodules; no carotid bruit or JVD  Back: Symmetric, no curvature, ROM normal, no CVA tenderness.  Lungs: Clear to auscultation bilaterally, respirations unlabored.  Breasts: No breast masses, tenderness, asymmetry, or nipple discharge.  Heart: Regular rate and rhythm, S1 and S2 normal, no murmur, rub, or gallop.  Abdomen: Soft, non-tender, bowel sounds active all four quadrants,  no masses, no organomegaly.  Extremities: Extremities normal, atraumatic, no cyanosis or edema.  Skin: Skin color, texture, turgor normal, no rashes or lesions.  Lymph nodes: Cervical, supraclavicular, and axillary nodes normal.  Neurologic: No focal neurological findings.          ASSESSMENT / PLAN:   (Z00.00) Encounter for Medicare annual wellness exam  (primary encounter diagnosis)  Patient refused Covid vaccine.  Flu vaccine given today.  She will check with Medicare if can have Tetanus booster.  Mammogram is scheduled for today.    (E03.9) Acquired hypothyroidism    Plan: levothyroxine (SYNTHROID/LEVOTHROID) 75 MCG         tablet, TSH with " "free T4 reflex            (E78.2) Mixed hyperlipidemia    Plan: rosuvastatin (CRESTOR) 10 MG tablet,         Comprehensive metabolic panel, Lipid panel         reflex to direct LDL Fasting            (I10) Essential hypertension  Comment: since BP higher and shed described some edema, we will increase diuretic dose. She will bring BP numbers on the next visit in 3 weeks. BMP should be rechecked in 3 weeks.  Plan:        CBC with platelets, Comprehensive         metabolic panel, UA reflex to Microscopic and         Culture, triamterene-HCTZ (MAXZIDE) 75-50 MG         tablet            (K21.00) Gastroesophageal reflux disease with esophagitis without hemorrhage  Comment: stable  Plan: omeprazole (PRILOSEC) 20 MG DR capsule            (R11.0) Nausea  Comment: occassionally  Plan: ondansetron (ZOFRAN) 4 MG tablet            (M81.0) Age-related osteoporosis without current pathological fracture  Comment: DXA was done in Sep 2020 . T-score in right hip -2.5. she was on Fosamax in the past. We did plan to start Prolia and was approved last year, but she changed her mind.  She refused any active treatment at this point. We will talk again in 3 weeks follow up. I will get Prolia approved again in a case she agrees to start treatment.  Plan: Vitamin D Deficiency              Patient has been advised of split billing requirements and indicates understanding: Yes  COUNSELING:       Regular exercise       Healthy diet/nutrition    Estimated body mass index is 27.41 kg/m  as calculated from the following:    Height as of this encounter: 1.543 m (5' 0.75\").    Weight as of this encounter: 65.3 kg (143 lb 14.4 oz).      She reports that she has never smoked. She has never used smokeless tobacco.      Appropriate preventive services were discussed with this patient, including applicable screening as appropriate for cardiovascular disease, diabetes, osteopenia/osteoporosis, and glaucoma.  As appropriate for age/gender, discussed " screening for colorectal cancer, prostate cancer, breast cancer, and cervical cancer. Checklist reviewing preventive services available has been given to the patient.    Reviewed patients plan of care and provided an AVS. The Basic Care Plan (routine screening as documented in Health Maintenance) for Chanel meets the Care Plan requirement. This Care Plan has been established and reviewed with the Patient.    Counseling Resources:  ATP IV Guidelines  Pooled Cohorts Equation Calculator  Breast Cancer Risk Calculator  Breast Cancer: Medication to Reduce Risk  FRAX Risk Assessment  ICSI Preventive Guidelines  Dietary Guidelines for Americans, 2010  USDA's MyPlate  ASA Prophylaxis  Lung CA Screening    Rusty Mcgowan MD  [facility]    Identified Health Risks:

## 2021-09-21 NOTE — PROGRESS NOTES
The patient was counseled and encouraged to consider modifying their diet and eating habits. She was provided with information on recommended healthy diet options.  The patient was provided with written information regarding signs of hearing loss.

## 2021-09-23 LAB — DEPRECATED CALCIDIOL+CALCIFEROL SERPL-MC: 67 UG/L (ref 30–80)

## 2021-09-28 ENCOUNTER — ANCILLARY PROCEDURE (OUTPATIENT)
Dept: MAMMOGRAPHY | Facility: CLINIC | Age: 76
End: 2021-09-28
Attending: INTERNAL MEDICINE
Payer: MEDICARE

## 2021-09-28 DIAGNOSIS — N64.89 BREAST ASYMMETRY: ICD-10-CM

## 2021-09-28 PROCEDURE — 77065 DX MAMMO INCL CAD UNI: CPT | Mod: RT

## 2021-10-06 ENCOUNTER — ANCILLARY PROCEDURE (OUTPATIENT)
Dept: MAMMOGRAPHY | Facility: CLINIC | Age: 76
End: 2021-10-06
Attending: INTERNAL MEDICINE
Payer: MEDICARE

## 2021-10-06 DIAGNOSIS — R92.1 BREAST CALCIFICATION, RIGHT: ICD-10-CM

## 2021-10-06 PROCEDURE — 272N000715 MA STEREOTACTIC BREAST BIOPSY VACUUM RT

## 2021-10-06 PROCEDURE — 250N000009 HC RX 250: Performed by: INTERNAL MEDICINE

## 2021-10-06 PROCEDURE — 88305 TISSUE EXAM BY PATHOLOGIST: CPT | Mod: TC | Performed by: INTERNAL MEDICINE

## 2021-10-06 RX ORDER — LIDOCAINE HYDROCHLORIDE AND EPINEPHRINE 10; 10 MG/ML; UG/ML
10 INJECTION, SOLUTION INFILTRATION; PERINEURAL ONCE
Status: COMPLETED | OUTPATIENT
Start: 2021-10-06 | End: 2021-10-06

## 2021-10-06 RX ADMIN — LIDOCAINE HYDROCHLORIDE 10 ML: 10 INJECTION, SOLUTION INFILTRATION; PERINEURAL at 14:19

## 2021-10-06 RX ADMIN — LIDOCAINE HYDROCHLORIDE,EPINEPHRINE BITARTRATE 10 ML: 10; .01 INJECTION, SOLUTION INFILTRATION; PERINEURAL at 14:19

## 2021-10-07 ENCOUNTER — TELEPHONE (OUTPATIENT)
Dept: MAMMOGRAPHY | Facility: CLINIC | Age: 76
End: 2021-10-07

## 2021-10-07 LAB
PATH REPORT.COMMENTS IMP SPEC: NORMAL
PATH REPORT.FINAL DX SPEC: NORMAL
PATH REPORT.GROSS SPEC: NORMAL
PATH REPORT.MICROSCOPIC SPEC OTHER STN: NORMAL
PATH REPORT.RELEVANT HX SPEC: NORMAL
PHOTO IMAGE: NORMAL

## 2021-10-07 PROCEDURE — 88305 TISSUE EXAM BY PATHOLOGIST: CPT | Mod: 26 | Performed by: PATHOLOGY

## 2021-10-07 NOTE — TELEPHONE ENCOUNTER
Pathology report reviewed with our breast radiologist, Dr. Ceballos, who confirmed the recent breast imaging is concordant with the final pathology results below.    I phoned patient, confirmed her full name, date of birth, and informed patient of her Breast Needle Biopsyresults showing benign Fibroadenoma.     Recommended follow up per Radiologist, Dr. Ceballos, is return to routine breast screening.    Patient reports no problems or concerns with her biopsy site.     Questions were answered and my phone number given if she has further questions or concerns.  I informed patient I will notify the ordering provider of the results and recommendations for follow up.  Patient verbalized understanding and agrees with the plan of care.     Carolee Fishman, RN, BSN  Breast Care Nurse Coordinator  [facility]  [phone]

## 2021-11-30 DIAGNOSIS — E78.2 MIXED HYPERLIPIDEMIA: ICD-10-CM

## 2021-12-02 RX ORDER — ROSUVASTATIN CALCIUM 10 MG/1
TABLET, COATED ORAL
Qty: 90 TABLET | Refills: 3 | Status: SHIPPED | OUTPATIENT
Start: 2021-12-02 | End: 2022-03-07

## 2021-12-08 DIAGNOSIS — Z92.29 PERSONAL HISTORY OF OTHER DRUG THERAPY: ICD-10-CM

## 2021-12-08 DIAGNOSIS — M81.0 AGE RELATED OSTEOPOROSIS, UNSPECIFIED PATHOLOGICAL FRACTURE PRESENCE: ICD-10-CM

## 2021-12-08 DIAGNOSIS — M81.0 AGE-RELATED OSTEOPOROSIS WITHOUT CURRENT PATHOLOGICAL FRACTURE: Primary | ICD-10-CM

## 2021-12-28 ENCOUNTER — OFFICE VISIT (OUTPATIENT)
Dept: INTERNAL MEDICINE | Facility: CLINIC | Age: 76
End: 2021-12-28
Payer: COMMERCIAL

## 2021-12-28 VITALS
SYSTOLIC BLOOD PRESSURE: 144 MMHG | HEIGHT: 61 IN | OXYGEN SATURATION: 95 % | DIASTOLIC BLOOD PRESSURE: 86 MMHG | BODY MASS INDEX: 26.41 KG/M2 | WEIGHT: 139.9 LBS | HEART RATE: 102 BPM

## 2021-12-28 DIAGNOSIS — I10 PRIMARY HYPERTENSION: Primary | ICD-10-CM

## 2021-12-28 DIAGNOSIS — E03.9 ACQUIRED HYPOTHYROIDISM: ICD-10-CM

## 2021-12-28 DIAGNOSIS — E78.2 MIXED HYPERLIPIDEMIA: ICD-10-CM

## 2021-12-28 DIAGNOSIS — M81.0 AGE-RELATED OSTEOPOROSIS WITHOUT CURRENT PATHOLOGICAL FRACTURE: ICD-10-CM

## 2021-12-28 LAB
ANION GAP SERPL CALCULATED.3IONS-SCNC: 12 MMOL/L (ref 5–18)
BUN SERPL-MCNC: 14 MG/DL (ref 8–28)
CALCIUM SERPL-MCNC: 11 MG/DL (ref 8.5–10.5)
CHLORIDE BLD-SCNC: 100 MMOL/L (ref 98–107)
CO2 SERPL-SCNC: 30 MMOL/L (ref 22–31)
CREAT SERPL-MCNC: 0.83 MG/DL (ref 0.6–1.1)
GFR SERPL CREATININE-BSD FRML MDRD: 73 ML/MIN/1.73M2
GLUCOSE BLD-MCNC: 102 MG/DL (ref 70–125)
POTASSIUM BLD-SCNC: 3.5 MMOL/L (ref 3.5–5)
SODIUM SERPL-SCNC: 142 MMOL/L (ref 136–145)

## 2021-12-28 PROCEDURE — 99214 OFFICE O/P EST MOD 30 MIN: CPT | Performed by: INTERNAL MEDICINE

## 2021-12-28 PROCEDURE — 36415 COLL VENOUS BLD VENIPUNCTURE: CPT | Performed by: INTERNAL MEDICINE

## 2021-12-28 PROCEDURE — 80048 BASIC METABOLIC PNL TOTAL CA: CPT | Performed by: INTERNAL MEDICINE

## 2021-12-28 ASSESSMENT — MIFFLIN-ST. JEOR: SCORE: 1057.99
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
## 2021-12-28 NOTE — PATIENT INSTRUCTIONS
Continue Maxzide 75/50 daily.  Check BP daily at home.  You can call us to report the numbers from home.    Annual wellness visit in 9/2021.

## 2021-12-29 DIAGNOSIS — E03.9 ACQUIRED HYPOTHYROIDISM: ICD-10-CM

## 2021-12-31 RX ORDER — LEVOTHYROXINE SODIUM 75 UG/1
TABLET ORAL
Qty: 90 TABLET | Refills: 2 | Status: SHIPPED | OUTPATIENT
Start: 2021-12-31 | End: 2022-10-06

## 2021-12-31 NOTE — TELEPHONE ENCOUNTER
"Last Written Prescription Date:  9/21/21  Last Fill Quantity: 90,  # refills: 0   Last office visit provider:  12/28/21     Requested Prescriptions   Pending Prescriptions Disp Refills     levothyroxine (SYNTHROID/LEVOTHROID) 75 MCG tablet [Pharmacy Med Name: LEVOTHYROXINE SODIUM 75MCG TABLET] 90 tablet 0     Sig: TAKE ONE TABLET BY MOUTH ONCE DAILY AS DIRECTED       Thyroid Protocol Passed - 12/31/2021 11:04 AM        Passed - Patient is 12 years or older        Passed - Recent (12 mo) or future (30 days) visit within the authorizing provider's specialty     Patient has had an office visit with the authorizing provider or a provider within the authorizing providers department within the previous 12 mos or has a future within next 30 days. See \"Patient Info\" tab in inbasket, or \"Choose Columns\" in Meds & Orders section of the refill encounter.              Passed - Medication is active on med list        Passed - Normal TSH on file in past 12 months     Recent Labs   Lab Test 09/21/21  1011   TSH 2.09              Passed - No active pregnancy on record     If patient is pregnant or has had a positive pregnancy test, please check TSH.          Passed - No positive pregnancy test in past 12 months     If patient is pregnant or has had a positive pregnancy test, please check TSH.               Zachary Cruz RN 12/31/21 11:14 AM  "

## 2021-12-31 NOTE — TELEPHONE ENCOUNTER
Reason for Call:  Other prescription    Detailed comments: pt calling for a refill on this - please fill when able before the weekend.    Phone Number Patient can be reached at: Home number on file [phone] (home)    Best Time: any    Can we leave a detailed message on this number? YES    Call taken on 12/31/2021 at 11:03 AM by Uday Fields

## 2022-01-11 DIAGNOSIS — I10 ESSENTIAL HYPERTENSION: ICD-10-CM

## 2022-01-11 RX ORDER — TRIAMTERENE AND HYDROCHLOROTHIAZIDE 75; 50 MG/1; MG/1
1 TABLET ORAL DAILY
Qty: 90 TABLET | Refills: 3 | Status: SHIPPED | OUTPATIENT
Start: 2022-01-11 | End: 2023-01-03

## 2022-03-04 DIAGNOSIS — E78.2 MIXED HYPERLIPIDEMIA: ICD-10-CM

## 2022-03-07 RX ORDER — ROSUVASTATIN CALCIUM 10 MG/1
TABLET, COATED ORAL
Qty: 90 TABLET | Refills: 1 | Status: SHIPPED | OUTPATIENT
Start: 2022-03-07 | End: 2022-11-28

## 2022-10-06 DIAGNOSIS — E03.9 ACQUIRED HYPOTHYROIDISM: ICD-10-CM

## 2022-10-06 RX ORDER — LEVOTHYROXINE SODIUM 75 UG/1
TABLET ORAL
Qty: 90 TABLET | Refills: 2 | Status: SHIPPED | OUTPATIENT
Start: 2022-10-06 | End: 2023-03-31

## 2022-10-06 NOTE — TELEPHONE ENCOUNTER
"Routing refill request to provider for review/approval because:  Labs not current:  tsh    Last Written Prescription Date:  12/31/21  Last Fill Quantity: 90,  # refills: 2   Last office visit provider:  12/28/21     Requested Prescriptions   Pending Prescriptions Disp Refills     levothyroxine (SYNTHROID/LEVOTHROID) 75 MCG tablet 90 tablet 2     Sig: TAKE ONE TABLET BY MOUTH ONCE DAILY AS DIRECTED       Thyroid Protocol Failed - 10/6/2022  2:17 PM        Failed - Normal TSH on file in past 12 months     Recent Labs   Lab Test 09/21/21  1011   TSH 2.09              Passed - Patient is 12 years or older        Passed - Recent (12 mo) or future (30 days) visit within the authorizing provider's specialty     Patient has had an office visit with the authorizing provider or a provider within the authorizing providers department within the previous 12 mos or has a future within next 30 days. See \"Patient Info\" tab in inbasket, or \"Choose Columns\" in Meds & Orders section of the refill encounter.              Passed - Medication is active on med list        Passed - No active pregnancy on record     If patient is pregnant or has had a positive pregnancy test, please check TSH.          Passed - No positive pregnancy test in past 12 months     If patient is pregnant or has had a positive pregnancy test, please check TSH.               Rose Kerr RN 10/06/22 2:27 PM  "

## 2022-11-28 DIAGNOSIS — E78.2 MIXED HYPERLIPIDEMIA: ICD-10-CM

## 2022-11-29 RX ORDER — ROSUVASTATIN CALCIUM 10 MG/1
10 TABLET, COATED ORAL AT BEDTIME
Qty: 90 TABLET | Refills: 1 | Status: SHIPPED | OUTPATIENT
Start: 2022-11-29 | End: 2023-02-28

## 2022-11-29 NOTE — TELEPHONE ENCOUNTER
"Routing refill request to provider for review/approval because:  Drug allergy warning    Last Written Prescription Date:  3/7/22  Last Fill Quantity: 90,  # refills: 1   Last office visit provider:  12/28/21     Requested Prescriptions   Pending Prescriptions Disp Refills     rosuvastatin (CRESTOR) 10 MG tablet 90 tablet 1     Sig: Take 1 tablet (10 mg) by mouth At Bedtime       Statins Protocol Failed - 11/28/2022 10:50 AM        Failed - LDL on file in past 12 months     Recent Labs   Lab Test 09/21/21  1011   LDL 61             Passed - No abnormal creatine kinase in past 12 months     No lab results found.             Passed - Recent (12 mo) or future (30 days) visit within the authorizing provider's specialty     Patient has had an office visit with the authorizing provider or a provider within the authorizing providers department within the previous 12 mos or has a future within next 30 days. See \"Patient Info\" tab in inbasket, or \"Choose Columns\" in Meds & Orders section of the refill encounter.              Passed - Medication is active on med list        Passed - Patient is age 18 or older        Passed - No active pregnancy on record        Passed - No positive pregnancy test in past 12 months             Michelle Santana RN 11/29/22 1:39 PM  "

## 2023-01-02 DIAGNOSIS — I10 ESSENTIAL HYPERTENSION: ICD-10-CM

## 2023-01-02 NOTE — TELEPHONE ENCOUNTER
"Routing refill request to provider for review/approval because:  Patient needs to be seen because it has been more than 1 year since last office visit.    Last Written Prescription Date:  1/11/22  Last Fill Quantity: 90,  # refills: 3   Last office visit provider:  12/28/21     Requested Prescriptions   Pending Prescriptions Disp Refills     triamterene-HCTZ (MAXZIDE) 75-50 MG tablet [Pharmacy Med Name: TRIAMTERENE 75MG/ HCTZ 50MG TABLETS] 90 tablet 3     Sig: TAKE 1 TABLET BY MOUTH EVERY DAY       Diuretics (Including Combos) Protocol Failed - 1/2/2023 10:42 AM        Failed - Blood pressure under 140/90 in past 12 months     BP Readings from Last 3 Encounters:   12/28/21 (!) 144/86   09/21/21 (!) 140/77   09/10/20 134/73                 Failed - Recent (12 mo) or future (30 days) visit within the authorizing provider's specialty     Patient has had an office visit with the authorizing provider or a provider within the authorizing providers department within the previous 12 mos or has a future within next 30 days. See \"Patient Info\" tab in inbasket, or \"Choose Columns\" in Meds & Orders section of the refill encounter.              Failed - Normal serum creatinine on file in past 12 months     Recent Labs   Lab Test 12/28/21  1517   CR 0.83              Failed - Normal serum potassium on file in past 12 months     Recent Labs   Lab Test 12/28/21  1517   POTASSIUM 3.5                    Failed - Normal serum sodium on file in past 12 months     Recent Labs   Lab Test 12/28/21  1517                 Passed - Medication is active on med list        Passed - Patient is age 18 or older        Passed - No active pregancy on record        Passed - No positive pregnancy test in past 12 months             Michelle Santana RN 01/02/23 5:52 PM  "

## 2023-01-03 RX ORDER — TRIAMTERENE AND HYDROCHLOROTHIAZIDE 75; 50 MG/1; MG/1
TABLET ORAL
Qty: 90 TABLET | Refills: 3 | Status: SHIPPED | OUTPATIENT
Start: 2023-01-03 | End: 2023-09-27

## 2023-02-27 DIAGNOSIS — E78.2 MIXED HYPERLIPIDEMIA: ICD-10-CM

## 2023-02-28 RX ORDER — ROSUVASTATIN CALCIUM 10 MG/1
TABLET, COATED ORAL
Qty: 90 TABLET | Refills: 1 | Status: SHIPPED | OUTPATIENT
Start: 2023-02-28 | End: 2023-12-27

## 2023-02-28 NOTE — TELEPHONE ENCOUNTER
"Routing refill request to provider for review/approval because:  Labs not current:  LDL  Patient needs to be seen because it has been more than 1 year since last office visit.  Early refill.  Pt should still have 1 90 day refill on file.    Last Written Prescription Date:  11/29/2022  Last Fill Quantity: 90,  # refills: 1   Last office visit provider:   12/28/2021    Requested Prescriptions   Pending Prescriptions Disp Refills     rosuvastatin (CRESTOR) 10 MG tablet [Pharmacy Med Name: ROSUVASTATIN 10MG TABLETS] 90 tablet 1     Sig: TAKE 1 TABLET(10 MG) BY MOUTH EVERY NIGHT AT BEDTIME       Statins Protocol Failed - 2/27/2023  7:07 AM        Failed - LDL on file in past 12 months     Recent Labs   Lab Test 09/21/21  1011   LDL 61             Failed - Recent (12 mo) or future (30 days) visit within the authorizing provider's specialty     Patient has had an office visit with the authorizing provider or a provider within the authorizing providers department within the previous 12 mos or has a future within next 30 days. See \"Patient Info\" tab in inbasket, or \"Choose Columns\" in Meds & Orders section of the refill encounter.              Passed - No abnormal creatine kinase in past 12 months     No lab results found.             Passed - Medication is active on med list        Passed - Patient is age 18 or older        Passed - No active pregnancy on record        Passed - No positive pregnancy test in past 12 months             Nicole Haynes 02/27/23 10:14 PM  "

## 2023-03-31 DIAGNOSIS — E03.9 ACQUIRED HYPOTHYROIDISM: ICD-10-CM

## 2023-03-31 RX ORDER — LEVOTHYROXINE SODIUM 75 UG/1
TABLET ORAL
Qty: 90 TABLET | Refills: 2 | Status: SHIPPED | OUTPATIENT
Start: 2023-03-31 | End: 2023-12-08

## 2023-03-31 NOTE — TELEPHONE ENCOUNTER
"Routing refill request to provider for review/approval because:  Labs not current:  Last TSH was 9/21/2021  Patient needs to be seen because it has been more than 1 year since last office visit.    Last Written Prescription Date:  10/6/2022  Last Fill Quantity: 90,  # refills: 2   Last office visit provider:  12/28/2021     Requested Prescriptions   Pending Prescriptions Disp Refills     levothyroxine (SYNTHROID/LEVOTHROID) 75 MCG tablet [Pharmacy Med Name: LEVOTHYROXINE 0.075MG (75MCG) TABS] 90 tablet 2     Sig: TAKE 1 TABLET BY MOUTH ONCE DAILY AS DIRECTED       Thyroid Protocol Failed - 3/31/2023  2:49 PM        Failed - Recent (12 mo) or future (30 days) visit within the authorizing provider's specialty     Patient has had an office visit with the authorizing provider or a provider within the authorizing providers department within the previous 12 mos or has a future within next 30 days. See \"Patient Info\" tab in inbasket, or \"Choose Columns\" in Meds & Orders section of the refill encounter.              Failed - Normal TSH on file in past 12 months     Recent Labs   Lab Test 09/21/21  1011   TSH 2.09              Passed - Patient is 12 years or older        Passed - Medication is active on med list        Passed - No active pregnancy on record     If patient is pregnant or has had a positive pregnancy test, please check TSH.          Passed - No positive pregnancy test in past 12 months     If patient is pregnant or has had a positive pregnancy test, please check TSH.               Nora Henley RN 03/31/23 2:49 PM  "

## 2023-07-28 ENCOUNTER — OFFICE VISIT (OUTPATIENT)
Dept: INTERNAL MEDICINE | Facility: CLINIC | Age: 78
End: 2023-07-28
Payer: COMMERCIAL

## 2023-07-28 VITALS
HEIGHT: 61 IN | BODY MASS INDEX: 27.17 KG/M2 | WEIGHT: 143.9 LBS | RESPIRATION RATE: 17 BRPM | DIASTOLIC BLOOD PRESSURE: 76 MMHG | TEMPERATURE: 98.7 F | OXYGEN SATURATION: 98 % | SYSTOLIC BLOOD PRESSURE: 124 MMHG | HEART RATE: 94 BPM

## 2023-07-28 DIAGNOSIS — E78.2 MIXED HYPERLIPIDEMIA: ICD-10-CM

## 2023-07-28 DIAGNOSIS — Z00.00 ENCOUNTER FOR MEDICARE ANNUAL WELLNESS EXAM: Primary | ICD-10-CM

## 2023-07-28 DIAGNOSIS — Z12.31 VISIT FOR SCREENING MAMMOGRAM: ICD-10-CM

## 2023-07-28 DIAGNOSIS — K21.00 GASTROESOPHAGEAL REFLUX DISEASE WITH ESOPHAGITIS WITHOUT HEMORRHAGE: ICD-10-CM

## 2023-07-28 DIAGNOSIS — M81.0 AGE-RELATED OSTEOPOROSIS WITHOUT CURRENT PATHOLOGICAL FRACTURE: ICD-10-CM

## 2023-07-28 DIAGNOSIS — E03.9 ACQUIRED HYPOTHYROIDISM: ICD-10-CM

## 2023-07-28 DIAGNOSIS — I10 PRIMARY HYPERTENSION: ICD-10-CM

## 2023-07-28 LAB
ALBUMIN SERPL BCG-MCNC: 4.5 G/DL (ref 3.5–5.2)
ALBUMIN UR-MCNC: NEGATIVE MG/DL
ALP SERPL-CCNC: 61 U/L (ref 35–104)
ALT SERPL W P-5'-P-CCNC: 16 U/L (ref 0–50)
AMORPH CRY #/AREA URNS HPF: ABNORMAL /HPF
ANION GAP SERPL CALCULATED.3IONS-SCNC: 12 MMOL/L (ref 7–15)
APPEARANCE UR: ABNORMAL
AST SERPL W P-5'-P-CCNC: 23 U/L (ref 0–45)
BILIRUB SERPL-MCNC: 0.6 MG/DL
BILIRUB UR QL STRIP: NEGATIVE
BUN SERPL-MCNC: 16 MG/DL (ref 8–23)
CALCIUM SERPL-MCNC: 10 MG/DL (ref 8.8–10.2)
CHLORIDE SERPL-SCNC: 100 MMOL/L (ref 98–107)
CHOLEST SERPL-MCNC: 156 MG/DL
COLOR UR AUTO: YELLOW
CREAT SERPL-MCNC: 0.86 MG/DL (ref 0.51–0.95)
DEPRECATED HCO3 PLAS-SCNC: 28 MMOL/L (ref 22–29)
ERYTHROCYTE [DISTWIDTH] IN BLOOD BY AUTOMATED COUNT: 12.5 % (ref 10–15)
GFR SERPL CREATININE-BSD FRML MDRD: 69 ML/MIN/1.73M2
GLUCOSE SERPL-MCNC: 88 MG/DL (ref 70–99)
GLUCOSE UR STRIP-MCNC: NEGATIVE MG/DL
HCT VFR BLD AUTO: 45 % (ref 35–47)
HDLC SERPL-MCNC: 51 MG/DL
HGB BLD-MCNC: 14.2 G/DL (ref 11.7–15.7)
HGB UR QL STRIP: NEGATIVE
KETONES UR STRIP-MCNC: NEGATIVE MG/DL
LDLC SERPL CALC-MCNC: 71 MG/DL
LEUKOCYTE ESTERASE UR QL STRIP: NEGATIVE
MCH RBC QN AUTO: 28.9 PG (ref 26.5–33)
MCHC RBC AUTO-ENTMCNC: 31.6 G/DL (ref 31.5–36.5)
MCV RBC AUTO: 92 FL (ref 78–100)
NITRATE UR QL: NEGATIVE
NONHDLC SERPL-MCNC: 105 MG/DL
PH UR STRIP: 7.5 [PH] (ref 5–8)
PLATELET # BLD AUTO: 255 10E3/UL (ref 150–450)
POTASSIUM SERPL-SCNC: 3.2 MMOL/L (ref 3.4–5.3)
PROT SERPL-MCNC: 8.2 G/DL (ref 6.4–8.3)
RBC # BLD AUTO: 4.92 10E6/UL (ref 3.8–5.2)
RBC #/AREA URNS AUTO: ABNORMAL /HPF
SODIUM SERPL-SCNC: 140 MMOL/L (ref 136–145)
SP GR UR STRIP: 1.01 (ref 1–1.03)
TRIGL SERPL-MCNC: 168 MG/DL
TSH SERPL DL<=0.005 MIU/L-ACNC: 2.16 UIU/ML (ref 0.3–4.2)
UROBILINOGEN UR STRIP-ACNC: 0.2 E.U./DL
WBC # BLD AUTO: 10.1 10E3/UL (ref 4–11)
WBC #/AREA URNS AUTO: ABNORMAL /HPF

## 2023-07-28 PROCEDURE — 84443 ASSAY THYROID STIM HORMONE: CPT | Performed by: INTERNAL MEDICINE

## 2023-07-28 PROCEDURE — 36415 COLL VENOUS BLD VENIPUNCTURE: CPT | Performed by: INTERNAL MEDICINE

## 2023-07-28 PROCEDURE — 82306 VITAMIN D 25 HYDROXY: CPT | Performed by: INTERNAL MEDICINE

## 2023-07-28 PROCEDURE — 99214 OFFICE O/P EST MOD 30 MIN: CPT | Mod: 25 | Performed by: INTERNAL MEDICINE

## 2023-07-28 PROCEDURE — 85027 COMPLETE CBC AUTOMATED: CPT | Performed by: INTERNAL MEDICINE

## 2023-07-28 PROCEDURE — 80053 COMPREHEN METABOLIC PANEL: CPT | Performed by: INTERNAL MEDICINE

## 2023-07-28 PROCEDURE — 80061 LIPID PANEL: CPT | Performed by: INTERNAL MEDICINE

## 2023-07-28 PROCEDURE — 81001 URINALYSIS AUTO W/SCOPE: CPT | Performed by: INTERNAL MEDICINE

## 2023-07-28 PROCEDURE — G0439 PPPS, SUBSEQ VISIT: HCPCS | Performed by: INTERNAL MEDICINE

## 2023-07-28 ASSESSMENT — ENCOUNTER SYMPTOMS
MYALGIAS: 0
NAUSEA: 0
EYE PAIN: 0
HEMATURIA: 0
HEMATOCHEZIA: 0
DIARRHEA: 0
CONSTIPATION: 0
SORE THROAT: 0
ABDOMINAL PAIN: 0
BREAST MASS: 0
FREQUENCY: 0
WEAKNESS: 0
HEADACHES: 0
NERVOUS/ANXIOUS: 0
PARESTHESIAS: 0
FEVER: 0
SHORTNESS OF BREATH: 0
ARTHRALGIAS: 0
CHILLS: 0
DIZZINESS: 0
HEARTBURN: 0
DYSURIA: 0
COUGH: 0
JOINT SWELLING: 0
PALPITATIONS: 0

## 2023-07-28 ASSESSMENT — ACTIVITIES OF DAILY LIVING (ADL): CURRENT_FUNCTION: NO ASSISTANCE NEEDED

## 2023-07-28 NOTE — PATIENT INSTRUCTIONS
Check with your insurance if tetanus vaccine and shingles vaccine are covered.    Labs today.    You are due for mammogram and DXA scan.    You can apply  Voltaren gel for 2 min and massage in the left wrist.      Patient Education   Personalized Prevention Plan  You are due for the preventive services outlined below.  Your care team is available to assist you in scheduling these services.  If you have already completed any of these items, please share that information with your care team to update in your medical record.  Health Maintenance Due   Topic Date Due    ANNUAL REVIEW OF HM ORDERS  Never done    Zoster (Shingles) Vaccine (1 of 2) Never done    Diptheria Tetanus Pertussis (DTAP/TDAP/TD) Vaccine (2 - Td or Tdap) 06/22/2020    COVID-19 Vaccine (2 - Booster for Nikkie series) 11/23/2021    Thyroid Function Lab  09/21/2022     Learning About Dietary Guidelines  What are the Dietary Guidelines for Americans?     Dietary Guidelines for Americans provide tips for eating well and staying healthy. This helps reduce the risk for long-term (chronic) diseases.  These guidelines recommend that you:  Eat and drink the right amount for you. The U.S. government's food guide is called MyPlate. It can help you make your own well-balanced eating plan.  Try to balance your eating with your activity. This helps you stay at a healthy weight.  Drink alcohol in moderation, if at all.  Limit foods high in salt, saturated fat, trans fat, and added sugar.  These guidelines are from the U.S. Department of Agriculture and the U.S. Department of Health and Human Services. They are updated every 5 years.  What is MyPlate?  MyPlate is the U.S. government's food guide. It can help you make your own well-balanced eating plan. A balanced eating plan means that you eat enough, but not too much, and that your food gives you the nutrients you need to stay healthy.  MyPlate focuses on eating plenty of whole grains, fruits, and vegetables, and  "on limiting fat and sugar. It is available online at www.ChooseMyPlate.gov.  How can you get started?  If you're trying to eat healthier, you can slowly change your eating habits over time. You don't have to make big changes all at once. Start by adding one or two healthy foods to your meals each day.  Grains  Choose whole-grain breads and cereals and whole-wheat pasta and whole-grain crackers.  Vegetables  Eat a variety of vegetables every day. They have lots of nutrients and are part of a heart-healthy diet.  Fruits  Eat a variety of fruits every day. Fruits contain lots of nutrients. Choose fresh fruit instead of fruit juice.  Protein foods  Choose fish and lean poultry more often. Eat red meat and fried meats less often. Dried beans, tofu, and nuts are also good sources of protein.  Dairy  Choose low-fat or fat-free products from this food group. If you have problems digesting milk, try eating cheese or yogurt instead.  Fats and oils  Limit fats and oils if you're trying to cut calories. Choose healthy fats when you cook. These include canola oil and olive oil.  Where can you learn more?  Go to https://www.[employer].net/patiented  Enter D676 in the search box to learn more about \"Learning About Dietary Guidelines.\"  Current as of: March 1, 2023               Content Version: 13.7    2063-8805 [employer].   Care instructions adapted under license by your healthcare professional. If you have questions about a medical condition or this instruction, always ask your healthcare professional. [employer] disclaims any warranty or liability for your use of this information.         "

## 2023-07-28 NOTE — PROGRESS NOTES
"SUBJECTIVE:   Chanel is a 77 year old who presents for Preventive Visit.      7/28/2023    12:32 PM   Additional Questions   Roomed by Iram       Are you in the first 12 months of your Medicare coverage?  No    Healthy Habits:     In general, how would you rate your overall health?  Excellent    Frequency of exercise:  2-3 days/week    Duration of exercise:  15-30 minutes    Do you usually eat at least 4 servings of fruit and vegetables a day, include whole grains    & fiber and avoid regularly eating high fat or \"junk\" foods?  No    Taking medications regularly:  Yes    Medication side effects:  None    Ability to successfully perform activities of daily living:  No assistance needed    Home Safety:  No safety concerns identified    Hearing Impairment:  No hearing concerns    In the past 6 months, have you been bothered by leaking of urine?  No    In general, how would you rate your overall mental or emotional health?  Good    Additional concerns today:  No        Have you ever done Advance Care Planning? (For example, a Health Directive, POLST, or a discussion with a medical provider or your loved ones about your wishes): Yes, advance care planning is on file.       Fall risk  Fallen 2 or more times in the past year?: No  Any fall with injury in the past year?: No    Cognitive Screening   1) Repeat 3 items (Leader, Season, Table)    2) Clock draw: NORMAL  3) 3 item recall: Recalls 3 objects  Results: ABNORMAL clock, 1-2 items recalled: PROBABLE COGNITIVE IMPAIRMENT, **INFORM PROVIDER**    Mini-CogTM Copyright DANII Herndon. Licensed by the author for use in [facility]; reprinted with permission (brian@.[email or web address]). All rights reserved.      Do you have sleep apnea, excessive snoring or daytime drowsiness? : no    Reviewed and updated as needed this visit by clinical staff   Tobacco  Allergies  Meds              Reviewed and updated as needed this visit by Provider                 Social History     Tobacco " Use    Smoking status: Never    Smokeless tobacco: Never   Substance Use Topics    Alcohol use: No             7/28/2023    12:39 PM   Alcohol Use   Prescreen: >3 drinks/day or >7 drinks/week? No     Do you have a current opioid prescription? No  Do you use any other controlled substances or medications that are not prescribed by a provider? None              Current providers sharing in care for this patient include:   Patient Care Team:  Rusty Mcgowan MD as PCP - General (Internal Medicine)  Rusty Mcgowan MD as Assigned PCP    The following health maintenance items are reviewed in Epic and correct as of today:  Health Maintenance   Topic Date Due    ANNUAL REVIEW OF HM ORDERS  Never done    ZOSTER IMMUNIZATION (1 of 2) Never done    DTAP/TDAP/TD IMMUNIZATION (2 - Td or Tdap) 06/22/2020    COVID-19 Vaccine (2 - Booster for Nikkie series) 11/23/2021    MEDICARE ANNUAL WELLNESS VISIT  09/21/2022    TSH W/FREE T4 REFLEX  09/21/2022    INFLUENZA VACCINE (1) 09/01/2023    FALL RISK ASSESSMENT  07/28/2024    LIPID  09/21/2026    ADVANCE CARE PLANNING  09/21/2026    COLORECTAL CANCER SCREENING  10/16/2030    DEXA  09/10/2035    HEPATITIS C SCREENING  Completed    PHQ-2 (once per calendar year)  Completed    Pneumococcal Vaccine: 65+ Years  Completed    IPV IMMUNIZATION  Aged Out    MENINGITIS IMMUNIZATION  Aged Out    MAMMO SCREENING  Discontinued             Pertinent mammograms are reviewed under the imaging tab.    Review of Systems   Constitutional:  Negative for chills and fever.   HENT:  Negative for congestion, ear pain, hearing loss and sore throat.    Eyes:  Negative for pain and visual disturbance.   Respiratory:  Negative for cough and shortness of breath.    Cardiovascular:  Negative for chest pain, palpitations and peripheral edema.   Gastrointestinal:  Negative for abdominal pain, constipation, diarrhea, heartburn, hematochezia and nausea.   Breasts:  Negative for tenderness, breast mass and  "discharge.   Genitourinary:  Negative for dysuria, frequency, genital sores, hematuria, pelvic pain, urgency, vaginal bleeding and vaginal discharge.   Musculoskeletal:  Negative for arthralgias, joint swelling and myalgias.   Skin:  Negative for rash.   Neurological:  Negative for dizziness, weakness, headaches and paresthesias.   Psychiatric/Behavioral:  Negative for mood changes. The patient is not nervous/anxious.          OBJECTIVE:   /76   Pulse 94   Temp 98.7  F (37.1  C)   Resp 17   Ht 1.537 m (5' 0.5\")   Wt 65.3 kg (143 lb 14.4 oz)   SpO2 98%   BMI 27.64 kg/m   Estimated body mass index is 27.64 kg/m  as calculated from the following:    Height as of this encounter: 1.537 m (5' 0.5\").    Weight as of this encounter: 65.3 kg (143 lb 14.4 oz).  Physical Exam  General Appearance: Alert, cooperative, no distress, appears stated age.  Head: Normocephalic, without obvious abnormality, atraumatic  Eyes: PERRL, conjunctiva/corneas clear, EOM's intact  Ears: Normal TM's and external ear canals, both ears  Nose: Nares normal, septum midline,mucosa normal, no drainage  Throat: Lips, mucosa, and tongue normal; teeth and gums normal  Neck: Supple, symmetrical, trachea midline, no adenopathy;  thyroid: not enlarged, symmetric, no tenderness/mass/nodules; no carotid bruit or JVD  Back: Symmetric, no curvature, ROM normal, no CVA tenderness.  Lungs: Clear to auscultation bilaterally, respirations unlabored.  Breasts: No breast masses, tenderness, asymmetry, or nipple discharge.  Heart: Regular rate and rhythm, S1 and S2 normal, no murmur, rub, or gallop.  Abdomen: Soft, non-tender, bowel sounds active all four quadrants,  no masses, no organomegaly.  Extremities: Extremities normal, atraumatic, no cyanosis or edema.  Skin: Skin color, texture, turgor normal, no rashes or lesions.  Lymph nodes: Cervical, supraclavicular, and axillary nodes normal.  Neurologic: No focal neurological findings.          ASSESSMENT " "/ PLAN:   (Z00.00) Encounter for Medicare annual wellness exam  (primary encounter diagnosis)      (E03.9) Acquired hypothyroidism  Comment: on levothyroxine  Plan: TSH with free T4 reflex            (M81.0) Age-related osteoporosis without current pathological fracture  Comment: patient declined osteoporosis treatment. She is due for DXA scan.  Plan: Vitamin D Deficiency, DX Hip/Pelvis/Spine            (I10) Primary hypertension  Comment: stable on Maxzide  Plan: CBC with platelets, Comprehensive metabolic         panel, UA Macroscopic with reflex to         Microscopic and Culture            (E78.2) Mixed hyperlipidemia  Comment: stable on Crestor  Plan: Lipid panel reflex to direct LDL Fasting            (K21.00) Gastroesophageal reflux disease with esophagitis without hemorrhage  Comment: stable on Prilosec      (Z12.31) Visit for screening mammogram  Comment:   Plan: MA Screen Bilateral w/Romel            Patient has been advised of split billing requirements and indicates understanding: Yes      COUNSELING:  Reviewed preventive health counseling, as reflected in patient instructions       Regular exercise       Healthy diet/nutrition      BMI:   Estimated body mass index is 27.64 kg/m  as calculated from the following:    Height as of this encounter: 1.537 m (5' 0.5\").    Weight as of this encounter: 65.3 kg (143 lb 14.4 oz).         She reports that she has never smoked. She has never used smokeless tobacco.      Appropriate preventive services were discussed with this patient, including applicable screening as appropriate for cardiovascular disease, diabetes, osteopenia/osteoporosis, and glaucoma.  As appropriate for age/gender, discussed screening for colorectal cancer, prostate cancer, breast cancer, and cervical cancer. Checklist reviewing preventive services available has been given to the patient.    Reviewed patients plan of care and provided an AVS. The Basic Care Plan (routine screening as documented " in Health Maintenance) for Chanel meets the Care Plan requirement. This Care Plan has been established and reviewed with the Patient.          Rusty Mcgowan MD  [facility]    Identified Health Risks:  I have reviewed Opioid Use Disorder and Substance Use Disorder risk factors and made any needed referrals.

## 2023-07-28 NOTE — PROGRESS NOTES
The patient was counseled and encouraged to consider modifying their diet and eating habits. She was provided with information on recommended healthy diet options.

## 2023-07-28 NOTE — LETTER
August 4, 2023      Chanel Regan  [address]  [place] [address]        Dear ,    We are writing to inform you of your test results.        Resulted Orders   CBC with platelets   Result Value Ref Range    WBC Count 10.1 4.0 - 11.0 10e3/uL    RBC Count 4.92 3.80 - 5.20 10e6/uL    Hemoglobin 14.2 11.7 - 15.7 g/dL    Hematocrit 45.0 35.0 - 47.0 %    MCV 92 78 - 100 fL    MCH 28.9 26.5 - 33.0 pg    MCHC 31.6 31.5 - 36.5 g/dL    RDW 12.5 10.0 - 15.0 %    Platelet Count 255 150 - 450 10e3/uL   Comprehensive metabolic panel   Result Value Ref Range    Sodium 140 136 - 145 mmol/L    Potassium 3.2 (L) 3.4 - 5.3 mmol/L    Chloride 100 98 - 107 mmol/L    Carbon Dioxide (CO2) 28 22 - 29 mmol/L    Anion Gap 12 7 - 15 mmol/L    Urea Nitrogen 16.0 8.0 - 23.0 mg/dL    Creatinine 0.86 0.51 - 0.95 mg/dL    Calcium 10.0 8.8 - 10.2 mg/dL    Glucose 88 70 - 99 mg/dL    Alkaline Phosphatase 61 35 - 104 U/L    AST 23 0 - 45 U/L      Comment:      Reference intervals for this test were updated on 6/12/2023 to more accurately reflect our healthy population. There may be differences in the flagging of prior results with similar values performed with this method. Interpretation of those prior results can be made in the context of the updated reference intervals.    ALT 16 0 - 50 U/L      Comment:      Reference intervals for this test were updated on 6/12/2023 to more accurately reflect our healthy population. There may be differences in the flagging of prior results with similar values performed with this method. Interpretation of those prior results can be made in the context of the updated reference intervals.      Protein Total 8.2 6.4 - 8.3 g/dL    Albumin 4.5 3.5 - 5.2 g/dL    Bilirubin Total 0.6 <=1.2 mg/dL    GFR Estimate 69 >60 mL/min/1.73m2   Lipid panel reflex to direct LDL Fasting   Result Value Ref Range    Cholesterol 156 <200 mg/dL    Triglycerides 168 (H) <150 mg/dL    Direct Measure HDL 51 >=50 mg/dL    LDL  Cholesterol Calculated 71 <=100 mg/dL    Non HDL Cholesterol 105 <130 mg/dL    Narrative    Cholesterol  Desirable:  <200 mg/dL    Triglycerides  Normal:  Less than 150 mg/dL  Borderline High:  150-199 mg/dL  High:  200-499 mg/dL  Very High:  Greater than or equal to 500 mg/dL    Direct Measure HDL  Female:  Greater than or equal to 50 mg/dL   Male:  Greater than or equal to 40 mg/dL    LDL Cholesterol  Desirable:  <100mg/dL  Above Desirable:  100-129 mg/dL   Borderline High:  130-159 mg/dL   High:  160-189 mg/dL   Very High:  >= 190 mg/dL    Non HDL Cholesterol  Desirable:  130 mg/dL  Above Desirable:  130-159 mg/dL  Borderline High:  160-189 mg/dL  High:  190-219 mg/dL  Very High:  Greater than or equal to 220 mg/dL   Vitamin D Deficiency   Result Value Ref Range    Vitamin D, Total (25-Hydroxy) 77 (H) 20 - 75 ug/L    Narrative    Season, race, dietary intake, and treatment affect the concentration of 25-hydroxy-Vitamin D. Values may decrease during winter months and increase during summer months. Values 20-29 ug/L may indicate Vitamin D insufficiency and values <20 ug/L may indicate Vitamin D deficiency.    Vitamin D determination is routinely performed by an immunoassay specific for 25 hydroxyvitamin D3.  If an individual is on vitamin D2(ergocalciferol) supplementation, please specify 25 OH vitamin D2 and D3 level determination by LCMSMS test VITD23.     TSH with free T4 reflex   Result Value Ref Range    TSH 2.16 0.30 - 4.20 uIU/mL   UA Macroscopic with reflex to Microscopic and Culture   Result Value Ref Range    Color Urine Yellow Colorless, Straw, Light Yellow, Yellow    Appearance Urine Cloudy (A) Clear    Glucose Urine Negative Negative mg/dL    Bilirubin Urine Negative Negative    Ketones Urine Negative Negative mg/dL    Specific Gravity Urine 1.015 1.005 - 1.030    Blood Urine Negative Negative    pH Urine 7.5 5.0 - 8.0    Protein Albumin Urine Negative Negative mg/dL    Urobilinogen Urine 0.2 0.2, 1.0  E.U./dL    Nitrite Urine Negative Negative    Leukocyte Esterase Urine Negative Negative   UA Microscopic with Reflex to Culture   Result Value Ref Range    RBC Urine None Seen 0-2 /HPF /HPF    WBC Urine None Seen 0-5 /HPF /HPF    Amorphous Crystals Urine Moderate (A) None Seen /HPF    Narrative    Urine Culture not indicated       If you have any questions or concerns, please call the clinic at the number listed above.       Sincerely,      Rusty Mcgowan MD

## 2023-07-29 LAB — DEPRECATED CALCIDIOL+CALCIFEROL SERPL-MC: 77 UG/L (ref 20–75)

## 2023-08-04 ENCOUNTER — TELEPHONE (OUTPATIENT)
Dept: INTERNAL MEDICINE | Facility: CLINIC | Age: 78
End: 2023-08-04
Payer: COMMERCIAL

## 2023-08-07 ENCOUNTER — HOSPITAL ENCOUNTER (OUTPATIENT)
Dept: MAMMOGRAPHY | Facility: CLINIC | Age: 78
Discharge: HOME | End: 2023-08-07
Attending: INTERNAL MEDICINE | Admitting: INTERNAL MEDICINE
Payer: MEDICARE

## 2023-08-07 DIAGNOSIS — Z12.31 VISIT FOR SCREENING MAMMOGRAM: ICD-10-CM

## 2023-08-07 PROCEDURE — 77067 SCR MAMMO BI INCL CAD: CPT

## 2023-08-14 ENCOUNTER — ANCILLARY PROCEDURE (OUTPATIENT)
Dept: BONE DENSITY | Facility: CLINIC | Age: 78
End: 2023-08-14
Attending: INTERNAL MEDICINE
Payer: COMMERCIAL

## 2023-08-14 DIAGNOSIS — M81.0 AGE-RELATED OSTEOPOROSIS WITHOUT CURRENT PATHOLOGICAL FRACTURE: ICD-10-CM

## 2023-08-14 PROCEDURE — 77080 DXA BONE DENSITY AXIAL: CPT | Mod: TC | Performed by: RADIOLOGY

## 2023-09-27 DIAGNOSIS — I10 ESSENTIAL HYPERTENSION: ICD-10-CM

## 2023-09-27 RX ORDER — TRIAMTERENE AND HYDROCHLOROTHIAZIDE 75; 50 MG/1; MG/1
TABLET ORAL
Qty: 90 TABLET | Refills: 3 | Status: SHIPPED | OUTPATIENT
Start: 2023-09-27

## 2023-10-23 ENCOUNTER — NURSE TRIAGE (OUTPATIENT)
Dept: INTERNAL MEDICINE | Facility: CLINIC | Age: 78
End: 2023-10-23
Payer: COMMERCIAL

## 2023-10-23 NOTE — TELEPHONE ENCOUNTER
Patient stated that she has been doing yard work and as worsening bilateral muscular shoulder and arm pain. She believed this was related to blood pressure medication opposed to the yard work so stopped triamterene-HCTZ (MAXZIDE). She stopped medication for several days but wasnt able to recall how long. During this time patient stated blood pressure has increased, she wasn't able to recall range. She has since resumed medication and blood pressure has returned to baseline.     Per disposition, patient to be seen within 3 days. Patient scheduled. Reviewed red flag symptoms and when to be seen in ED or call clinic.       Reason for Disposition   MODERATE pain (e.g., interferes with normal activities) and present > 3 days    Additional Information   Negative: Shock suspected (e.g., cold/pale/clammy skin, too weak to stand, low BP, rapid pulse)   Negative: Similar pain previously and it was from 'heart attack'   Negative: Similar pain previously from 'angina' and not relieved by nitroglycerin   Negative: Sounds like a life-threatening emergency to the triager   Negative: Difficulty breathing or unusual sweating (e.g., sweating without exertion)   Negative: Chest pain lasting longer than 5 minutes   Negative: Age > 40 and no obvious cause for pain, pain still present even when not moving the arm   Negative: Fever and red area (or area very tender to touch)   Negative: Swollen joint and fever   Negative: Entire arm is swollen   Negative: Patient sounds very sick or weak to the triager   Negative: SEVERE pain (e.g., excruciating, unable to do any normal activities)   Negative: Red area or streak > 2 inches (or 5 cm)   Negative: Cast on wrist or arm and now increasing pain   Negative: Weakness (i.e., loss of strength) in hand or fingers  (Exception: Not truly weak; hand feels weak because of pain.)   Negative: Arm pains with exertion (e.g., occurs with walking; goes away on resting)   Negative: Painful rash with multiple  small blisters grouped together (i.e., dermatomal distribution or 'band' or 'stripe')   Negative: Looks like a boil, infected sore, deep ulcer, or other infected rash (spreading redness, pus)   Negative: Localized rash is very painful (no fever)   Negative: Numbness (i.e., loss of sensation) in hand or fingers   Negative: Localized pain, redness or hard lump along vein   Negative: Patient wants to be seen    Protocols used: Arm Pain-A-OH

## 2023-10-23 NOTE — TELEPHONE ENCOUNTER
Reason for Call:  Appointment Request    Patient requesting this type of appt: Chronic Diease Management/Medication/Follow-Up    Requested provider: Rusty Mcgowan    Reason patient unable to be scheduled: Not within requested timeframe    When does patient want to be seen/preferred time: Same day    Comments: patient wondering if she could be worked in f today. She would like to discuss a change in her blood pressure medication.    Okay to leave a detailed message?: Yes at Home number on file [phone] (home)    Call taken on 10/23/2023 at 6:26 AM by Delicia Maldonado

## 2023-10-26 ENCOUNTER — OFFICE VISIT (OUTPATIENT)
Dept: INTERNAL MEDICINE | Facility: CLINIC | Age: 78
End: 2023-10-26
Payer: COMMERCIAL

## 2023-10-26 VITALS
HEIGHT: 60 IN | SYSTOLIC BLOOD PRESSURE: 138 MMHG | OXYGEN SATURATION: 98 % | RESPIRATION RATE: 18 BRPM | HEART RATE: 90 BPM | DIASTOLIC BLOOD PRESSURE: 80 MMHG | WEIGHT: 145.9 LBS | BODY MASS INDEX: 28.64 KG/M2 | TEMPERATURE: 97.8 F

## 2023-10-26 DIAGNOSIS — E78.2 MIXED HYPERLIPIDEMIA: ICD-10-CM

## 2023-10-26 DIAGNOSIS — M79.601 BILATERAL ARM PAIN: Primary | ICD-10-CM

## 2023-10-26 DIAGNOSIS — M79.602 BILATERAL ARM PAIN: Primary | ICD-10-CM

## 2023-10-26 DIAGNOSIS — I10 PRIMARY HYPERTENSION: ICD-10-CM

## 2023-10-26 PROCEDURE — 90662 IIV NO PRSV INCREASED AG IM: CPT | Performed by: INTERNAL MEDICINE

## 2023-10-26 PROCEDURE — G0008 ADMIN INFLUENZA VIRUS VAC: HCPCS | Performed by: INTERNAL MEDICINE

## 2023-10-26 PROCEDURE — 99214 OFFICE O/P EST MOD 30 MIN: CPT | Mod: 25 | Performed by: INTERNAL MEDICINE

## 2023-10-26 RX ORDER — RESPIRATORY SYNCYTIAL VIRUS VACCINE 120MCG/0.5
0.5 KIT INTRAMUSCULAR ONCE
Qty: 1 EACH | Refills: 0 | Status: CANCELLED | OUTPATIENT
Start: 2023-10-26 | End: 2023-10-26

## 2023-10-26 NOTE — PATIENT INSTRUCTIONS
Stop rosuvastatin for a month and we will see if the muscle pain in your arms will go away.    I would continue same BP medications.    Take Tylenol 2 pills in the morning and Tylenol 2 pills at bedtime.    If the pain is gone in a month, let's try rosuvastatin again.    Flu vaccine today.

## 2023-10-26 NOTE — PROGRESS NOTES
Assessment & Plan     Bilateral arm pain  Patient describes bilat upper arm pain for about 10-14 days. It started few days after she did some yard work and house cleaning. Usually is worse at night and she needs to get up and stretch her arms. After she takes 2 Tylenol in the morning, pain is very mild during the day.  She was concerned that maybe Maxzide is causing this pain and she stopped it for few days, but her SBP was up to 150-160. She is now back on this med, but did not take it today. Sometimes is very low when she checks it at home, sometimes is very high. She thinks that their home machine does not work well.    Patient has myalgia on Zocor and Lipitor. Now on Crestor for 5 years and was tolerating it well. Usually in the past, myalgia was starting after few year on the statin.      Primary hypertension  She was concerned that maybe Maxzide is causing this pain and she stopped it for few days, but her SBP was up to 150-160. She is now back on this med, but did not take it today. Sometimes is very low when she checks it at home, sometimes is very high. She thinks that their home machine does not work well.    We will continue Maxzide. She is taking it for years and it helps with some fluid overload too.    Mixed hyperlipidemia  Patient has myalgia on Zocor and Lipitor. Now on Crestor for 5 years and was tolerating it well. Usually in the past, myalgia was starting after few year on the statin.      On the exam, she has full range of motion in both arms, no soft tissues swelling or restrictions in her shoulders and upper arms..             BMI:   Estimated body mass index is 28.49 kg/m  as calculated from the following:    Height as of this encounter: 1.524 m (5').    Weight as of this encounter: 66.2 kg (145 lb 14.4 oz).       Patient Instructions   Stop rosuvastatin for a month and we will see if the muscle pain in your arms will go away.    I would continue same BP medications.    Take Tylenol 2 pills  in the morning and Tylenol 2 pills at bedtime.    If the pain is gone in a month, let's try rosuvastatin again.    Flu vaccine today.     Return in about 2 months (around 12/26/2023) for Follow up.     Rusty Mcgowan MD  [facility]    Salome Buchanan is a 78 year old, presenting for the following health issues:  Pain (Bilateral pain in both arms mostly at night.)      10/26/2023    12:22 PM   Additional Questions   Roomed by lc-lpn   Accompanied by n/a       Pain                   Review of Systems         Objective    /80 (BP Location: Right arm, Patient Position: Sitting, Cuff Size: Adult Regular)   Pulse 90   Temp 97.8  F (36.6  C) (Oral)   Resp 18   Ht 1.524 m (5')   Wt 66.2 kg (145 lb 14.4 oz)   SpO2 98%   BMI 28.49 kg/m    Body mass index is 28.49 kg/m .  Physical Exam

## 2023-11-17 ENCOUNTER — TELEPHONE (OUTPATIENT)
Dept: INTERNAL MEDICINE | Facility: CLINIC | Age: 78
End: 2023-11-17
Payer: COMMERCIAL

## 2023-11-17 NOTE — TELEPHONE ENCOUNTER
Notified pt what Venessa said.    Told her if it continues to call and we will try to get her in to see a covering provider or she could come to [facility]

## 2023-11-17 NOTE — TELEPHONE ENCOUNTER
Incoming call from patient.     Pt report pain arm pain starting from elbow and up.  Pain has been worsening from the LOV 10/26.    Pain also radiates to wrist, back, and collar bone that goes away right away.   Report that she never had this kind of pain before.     LOV 10/26 She was concerned that maybe Maxzide is causing this pain     Stop rosuvastatin for a month and we will see if the muscle pain in your arms will go away.     Pt is now concern that the pain is caused by the other mediations that she is taking.   Pt is currently not taking Rosuvastatin per order but still having pain.     Pt needs directions due to ongoing pain after medication discontinuation.   Pt have a Follow-up appointment on 12/8, is it okay to wait until then?     Yosef CORTES RN

## 2023-12-08 ENCOUNTER — ANCILLARY PROCEDURE (OUTPATIENT)
Dept: GENERAL RADIOLOGY | Facility: CLINIC | Age: 78
End: 2023-12-08
Attending: INTERNAL MEDICINE
Payer: COMMERCIAL

## 2023-12-08 ENCOUNTER — OFFICE VISIT (OUTPATIENT)
Dept: INTERNAL MEDICINE | Facility: CLINIC | Age: 78
End: 2023-12-08
Payer: COMMERCIAL

## 2023-12-08 VITALS
HEIGHT: 61 IN | BODY MASS INDEX: 27.17 KG/M2 | DIASTOLIC BLOOD PRESSURE: 77 MMHG | WEIGHT: 143.9 LBS | SYSTOLIC BLOOD PRESSURE: 124 MMHG | OXYGEN SATURATION: 98 % | RESPIRATION RATE: 16 BRPM | HEART RATE: 84 BPM | TEMPERATURE: 97.3 F

## 2023-12-08 DIAGNOSIS — E03.9 ACQUIRED HYPOTHYROIDISM: ICD-10-CM

## 2023-12-08 DIAGNOSIS — K21.00 GASTROESOPHAGEAL REFLUX DISEASE WITH ESOPHAGITIS WITHOUT HEMORRHAGE: ICD-10-CM

## 2023-12-08 DIAGNOSIS — M79.10 MYALGIA: Primary | ICD-10-CM

## 2023-12-08 DIAGNOSIS — M79.10 MYALGIA: ICD-10-CM

## 2023-12-08 DIAGNOSIS — Z01.83 BLOOD TYPING ENCOUNTER: ICD-10-CM

## 2023-12-08 DIAGNOSIS — E78.2 MIXED HYPERLIPIDEMIA: ICD-10-CM

## 2023-12-08 DIAGNOSIS — I10 PRIMARY HYPERTENSION: ICD-10-CM

## 2023-12-08 LAB
ABO/RH(D): NORMAL
ALBUMIN SERPL BCG-MCNC: 4.6 G/DL (ref 3.5–5.2)
ALP SERPL-CCNC: 68 U/L (ref 40–150)
ALT SERPL W P-5'-P-CCNC: 32 U/L (ref 0–50)
ANION GAP SERPL CALCULATED.3IONS-SCNC: 14 MMOL/L (ref 7–15)
AST SERPL W P-5'-P-CCNC: 29 U/L (ref 0–45)
BILIRUB SERPL-MCNC: 0.5 MG/DL
BUN SERPL-MCNC: 15.3 MG/DL (ref 8–23)
CALCIUM SERPL-MCNC: 10.5 MG/DL (ref 8.8–10.2)
CHLORIDE SERPL-SCNC: 99 MMOL/L (ref 98–107)
CK SERPL-CCNC: 75 U/L (ref 26–192)
CREAT SERPL-MCNC: 0.91 MG/DL (ref 0.51–0.95)
DEPRECATED HCO3 PLAS-SCNC: 28 MMOL/L (ref 22–29)
EGFRCR SERPLBLD CKD-EPI 2021: 64 ML/MIN/1.73M2
ERYTHROCYTE [SEDIMENTATION RATE] IN BLOOD BY WESTERGREN METHOD: 18 MM/HR (ref 0–30)
GLUCOSE SERPL-MCNC: 103 MG/DL (ref 70–99)
POTASSIUM SERPL-SCNC: 3.5 MMOL/L (ref 3.4–5.3)
PROT SERPL-MCNC: 8.7 G/DL (ref 6.4–8.3)
SODIUM SERPL-SCNC: 141 MMOL/L (ref 135–145)
SPECIMEN EXPIRATION DATE: NORMAL
T4 FREE SERPL-MCNC: 1.77 NG/DL (ref 0.9–1.7)
TSH SERPL DL<=0.005 MIU/L-ACNC: 4.67 UIU/ML (ref 0.3–4.2)

## 2023-12-08 PROCEDURE — 71046 X-RAY EXAM CHEST 2 VIEWS: CPT | Mod: TC | Performed by: RADIOLOGY

## 2023-12-08 PROCEDURE — 80053 COMPREHEN METABOLIC PANEL: CPT | Performed by: INTERNAL MEDICINE

## 2023-12-08 PROCEDURE — 36415 COLL VENOUS BLD VENIPUNCTURE: CPT | Performed by: INTERNAL MEDICINE

## 2023-12-08 PROCEDURE — 82550 ASSAY OF CK (CPK): CPT | Performed by: INTERNAL MEDICINE

## 2023-12-08 PROCEDURE — 86901 BLOOD TYPING SEROLOGIC RH(D): CPT | Performed by: INTERNAL MEDICINE

## 2023-12-08 PROCEDURE — 84443 ASSAY THYROID STIM HORMONE: CPT | Performed by: INTERNAL MEDICINE

## 2023-12-08 PROCEDURE — 86900 BLOOD TYPING SEROLOGIC ABO: CPT | Performed by: INTERNAL MEDICINE

## 2023-12-08 PROCEDURE — 99214 OFFICE O/P EST MOD 30 MIN: CPT | Performed by: INTERNAL MEDICINE

## 2023-12-08 PROCEDURE — 85652 RBC SED RATE AUTOMATED: CPT | Performed by: INTERNAL MEDICINE

## 2023-12-08 PROCEDURE — 84439 ASSAY OF FREE THYROXINE: CPT | Performed by: INTERNAL MEDICINE

## 2023-12-08 RX ORDER — ROSUVASTATIN CALCIUM 10 MG/1
TABLET, COATED ORAL
Qty: 90 TABLET | Refills: 1 | Status: CANCELLED | OUTPATIENT
Start: 2023-12-08

## 2023-12-08 RX ORDER — PREDNISONE 5 MG/1
TABLET ORAL
Qty: 80 TABLET | Refills: 0 | Status: SHIPPED | OUTPATIENT
Start: 2023-12-08

## 2023-12-08 RX ORDER — LEVOTHYROXINE SODIUM 75 UG/1
TABLET ORAL
Qty: 90 TABLET | Refills: 2 | Status: SHIPPED | OUTPATIENT
Start: 2023-12-08 | End: 2024-01-11

## 2023-12-08 NOTE — LETTER
December 13, 2023      Chanel Regan  [address]  [place] [address]        Dear ,    We are writing to inform you of your test results.        Resulted Orders   ESR: Erythrocyte sedimentation rate   Result Value Ref Range    Erythrocyte Sedimentation Rate 18 0 - 30 mm/hr   CK total   Result Value Ref Range    CK 75 26 - 192 U/L   Comprehensive metabolic panel   Result Value Ref Range    Sodium 141 135 - 145 mmol/L      Comment:      Reference intervals for this test were updated on 09/26/2023 to more accurately reflect our healthy population. There may be differences in the flagging of prior results with similar values performed with this method. Interpretation of those prior results can be made in the context of the updated reference intervals.     Potassium 3.5 3.4 - 5.3 mmol/L    Carbon Dioxide (CO2) 28 22 - 29 mmol/L    Anion Gap 14 7 - 15 mmol/L    Urea Nitrogen 15.3 8.0 - 23.0 mg/dL    Creatinine 0.91 0.51 - 0.95 mg/dL    GFR Estimate 64 >60 mL/min/1.73m2    Calcium 10.5 (H) 8.8 - 10.2 mg/dL    Chloride 99 98 - 107 mmol/L    Glucose 103 (H) 70 - 99 mg/dL    Alkaline Phosphatase 68 40 - 150 U/L      Comment:      Reference intervals for this test were updated on 11/14/2023 to more accurately reflect our healthy population. There may be differences in the flagging of prior results with similar values performed with this method. Interpretation of those prior results can be made in the context of the updated reference intervals.    AST 29 0 - 45 U/L      Comment:      Reference intervals for this test were updated on 6/12/2023 to more accurately reflect our healthy population. There may be differences in the flagging of prior results with similar values performed with this method. Interpretation of those prior results can be made in the context of the updated reference intervals.    ALT 32 0 - 50 U/L      Comment:      Reference intervals for this test were updated on 6/12/2023 to more  accurately reflect our healthy population. There may be differences in the flagging of prior results with similar values performed with this method. Interpretation of those prior results can be made in the context of the updated reference intervals.      Protein Total 8.7 (H) 6.4 - 8.3 g/dL    Albumin 4.6 3.5 - 5.2 g/dL    Bilirubin Total 0.5 <=1.2 mg/dL   TSH with free T4 reflex   Result Value Ref Range    TSH 4.67 (H) 0.30 - 4.20 uIU/mL   ABO and Rh   Result Value Ref Range    ABO/RH(D) A POS     SPECIMEN EXPIRATION DATE [id number]    T4 free   Result Value Ref Range    Free T4 1.77 (H) 0.90 - 1.70 ng/dL       If you have any questions or concerns, please call the clinic at the number listed above.       Sincerely,      Rusty Mcgowan MD

## 2023-12-08 NOTE — PATIENT INSTRUCTIONS
Prednisone - Take 10 mg prednisone daily for 2 weeks, then 7.5 mg daily for 2 weeks, then 5 mg daily for 2 weeks, then 2.5 mg daily for 2 weeks, then stop.    Do not take rosuvastatin.    Labs today. CXR today.

## 2023-12-08 NOTE — PROGRESS NOTES
Assessment & Plan     Myalgia  Patient describes bilat upper arm pain for about 2 months, mostly during the night and in the morning. Usually is worse at night and she needs to get up and stretch her arms. After she takes 2 Tylenol in the morning, pain is very milder during the day. She noticed also occasional pain in her shoulders, neck. No paresthesia in arms and hands. No injuries. No pelvic, griddle, leg pain. Pain did not improve with stopping Crestor for a month.  She denies chest pain, dyspnea, but has some mild cough.  Possible PMR, we will start the prednisone and get the labs. She will not restart Crestor.  She will continue Maxzide for BP.     She has a h/o cryptogenic pneumonia on 2016 and was on prednisone for about a year. No respiratory issues since then. We will get the CXR today.  - ESR: Erythrocyte sedimentation rate; Future  - CK total; Future  - Comprehensive metabolic panel; Future  - TSH with free T4 reflex; Future  - predniSONE (DELTASONE) 5 MG tablet; Take 10 mg prednisone daily for 2 weeks, then 7.5 mg daily for 2 weeks, then 5 mg daily for 2 weeks, then 2.5 mg daily for 2 weeks, then stop  - XR Chest 2 Views; Future  - ESR: Erythrocyte sedimentation rate  - CK total  - Comprehensive metabolic panel  - TSH with free T4 reflex    Gastroesophageal reflux disease with esophagitis without hemorrhage  Stable, no heartburn.  - omeprazole (PRILOSEC) 20 MG DR capsule; Take 1 capsule (20 mg) by mouth 2 times daily (before meals)    Mixed hyperlipidemia  She will stay off Crestor for another month.    Acquired hypothyroidism  On levothyroxine  - levothyroxine (SYNTHROID/LEVOTHROID) 75 MCG tablet; TAKE 1 TABLET BY MOUTH ONCE DAILY AS DIRECTED    Blood typing encounter  She would like to know her blood type.  - ABO and Rh; Future  - ABO and Rh    Primary hypertension  Stable on Maxzide.She was concerned that maybe Maxzide is causing this pain and she stopped it for few days, but her SBP was up to  "150-160.   We will continue Maxzide. She is taking it for years and it helps with some fluid overload too.            BMI:   Estimated body mass index is 27.64 kg/m  as calculated from the following:    Height as of this encounter: 1.537 m (5' 0.5\").    Weight as of this encounter: 65.3 kg (143 lb 14.4 oz).       Patient Instructions   Prednisone - Take 10 mg prednisone daily for 2 weeks, then 7.5 mg daily for 2 weeks, then 5 mg daily for 2 weeks, then 2.5 mg daily for 2 weeks, then stop.    Do not take rosuvastatin.    Labs today. CXR today.     Return in about 4 weeks (around 1/5/2024) for Follow up.     Rusty Mcgowan MD  [facility]   Chanel is a 78 year old, presenting for the following health issues:  Follow Up (F/U Arm Pain Still Having)      12/8/2023     8:47 AM   Additional Questions   Roomed by Iram       History of Present Illness       Hyperlipidemia:  She presents for follow up of hyperlipidemia.   She is taking medication to lower cholesterol. She is having myalgia or other side effects to statin medications.    Hypertension: She presents for follow up of hypertension.  She does not check blood pressure  regularly outside of the clinic. Outpatient blood pressures have not been over 140/90. She follows a low salt diet.     She eats 0-1 servings of fruits and vegetables daily.She consumes 0 sweetened beverage(s) daily.She exercises with enough effort to increase her heart rate 10 to 19 minutes per day.    She is taking medications regularly.                 Review of Systems         Objective    /77   Pulse 84   Temp 97.3  F (36.3  C)   Resp 16   Ht 1.537 m (5' 0.5\")   Wt 65.3 kg (143 lb 14.4 oz)   LMP  (LMP Unknown)   SpO2 98%   BMI 27.64 kg/m    Body mass index is 27.64 kg/m .  Physical Exam     Constitutional:  oriented to person, place, and time, appears well-nourished. No distress.   HENT:   Head: Normocephalic.   Mouth/Throat: " Oropharynx is clear and moist.   Eyes: Conjunctivae are normal. Pupils are equal, round, and reactive to light.   Neck: Normal range of motion. Neck supple.   Cardiovascular: Normal rate, regular rhythm and normal heart sounds.    Pulmonary/Chest: Effort normal and breath sounds normal.   Abdominal: Soft. Bowel sounds are normal.   Musculoskeletal: Normal range of motion. Cross arm test normal. Normal range of motion in shoulders and arms.  Neurological: alert and oriented to person, place, and time. Skin: Skin is warm.   Psychiatric: normal mood and affect.

## 2023-12-22 DIAGNOSIS — E78.2 MIXED HYPERLIPIDEMIA: ICD-10-CM

## 2023-12-27 RX ORDER — ROSUVASTATIN CALCIUM 10 MG/1
TABLET, COATED ORAL
Qty: 90 TABLET | Refills: 2 | Status: SHIPPED | OUTPATIENT
Start: 2023-12-27 | End: 2024-01-09

## 2024-01-09 ENCOUNTER — OFFICE VISIT (OUTPATIENT)
Dept: INTERNAL MEDICINE | Facility: CLINIC | Age: 79
End: 2024-01-09
Payer: COMMERCIAL

## 2024-01-09 VITALS
OXYGEN SATURATION: 98 % | HEART RATE: 86 BPM | WEIGHT: 147.7 LBS | SYSTOLIC BLOOD PRESSURE: 132 MMHG | BODY MASS INDEX: 27.89 KG/M2 | HEIGHT: 61 IN | RESPIRATION RATE: 16 BRPM | DIASTOLIC BLOOD PRESSURE: 84 MMHG

## 2024-01-09 DIAGNOSIS — E03.9 ACQUIRED HYPOTHYROIDISM: ICD-10-CM

## 2024-01-09 DIAGNOSIS — M79.10 MYALGIA: Primary | ICD-10-CM

## 2024-01-09 DIAGNOSIS — E83.52 HYPERCALCEMIA: ICD-10-CM

## 2024-01-09 DIAGNOSIS — K21.00 GASTROESOPHAGEAL REFLUX DISEASE WITH ESOPHAGITIS WITHOUT HEMORRHAGE: ICD-10-CM

## 2024-01-09 DIAGNOSIS — I10 PRIMARY HYPERTENSION: ICD-10-CM

## 2024-01-09 DIAGNOSIS — M85.80 OSTEOPENIA WITH HIGH RISK OF FRACTURE: ICD-10-CM

## 2024-01-09 DIAGNOSIS — E78.2 MIXED HYPERLIPIDEMIA: ICD-10-CM

## 2024-01-09 LAB
ALBUMIN SERPL BCG-MCNC: 4.5 G/DL (ref 3.5–5.2)
ALP SERPL-CCNC: 55 U/L (ref 40–150)
ALT SERPL W P-5'-P-CCNC: 20 U/L (ref 0–50)
ANION GAP SERPL CALCULATED.3IONS-SCNC: 11 MMOL/L (ref 7–15)
AST SERPL W P-5'-P-CCNC: 21 U/L (ref 0–45)
BILIRUB SERPL-MCNC: 0.4 MG/DL
BUN SERPL-MCNC: 16.6 MG/DL (ref 8–23)
CA-I BLD-MCNC: 4.8 MG/DL (ref 4.4–5.2)
CALCIUM SERPL-MCNC: 10.5 MG/DL (ref 8.8–10.2)
CHLORIDE SERPL-SCNC: 101 MMOL/L (ref 98–107)
CREAT SERPL-MCNC: 0.97 MG/DL (ref 0.51–0.95)
DEPRECATED HCO3 PLAS-SCNC: 30 MMOL/L (ref 22–29)
EGFRCR SERPLBLD CKD-EPI 2021: 60 ML/MIN/1.73M2
GLUCOSE SERPL-MCNC: 84 MG/DL (ref 70–99)
POTASSIUM SERPL-SCNC: 3.4 MMOL/L (ref 3.4–5.3)
PROT SERPL-MCNC: 8.3 G/DL (ref 6.4–8.3)
PTH-INTACT SERPL-MCNC: 26 PG/ML (ref 15–65)
SODIUM SERPL-SCNC: 142 MMOL/L (ref 135–145)
T4 FREE SERPL-MCNC: 1.76 NG/DL (ref 0.9–1.7)
TSH SERPL DL<=0.005 MIU/L-ACNC: 6 UIU/ML (ref 0.3–4.2)

## 2024-01-09 PROCEDURE — 80053 COMPREHEN METABOLIC PANEL: CPT | Performed by: INTERNAL MEDICINE

## 2024-01-09 PROCEDURE — 36415 COLL VENOUS BLD VENIPUNCTURE: CPT | Performed by: INTERNAL MEDICINE

## 2024-01-09 PROCEDURE — 99214 OFFICE O/P EST MOD 30 MIN: CPT | Performed by: INTERNAL MEDICINE

## 2024-01-09 PROCEDURE — 83970 ASSAY OF PARATHORMONE: CPT | Performed by: INTERNAL MEDICINE

## 2024-01-09 PROCEDURE — 84443 ASSAY THYROID STIM HORMONE: CPT | Performed by: INTERNAL MEDICINE

## 2024-01-09 PROCEDURE — 84439 ASSAY OF FREE THYROXINE: CPT | Performed by: INTERNAL MEDICINE

## 2024-01-09 PROCEDURE — 82330 ASSAY OF CALCIUM: CPT | Performed by: INTERNAL MEDICINE

## 2024-01-09 NOTE — PROGRESS NOTES
Assessment & Plan     Myalgia  Patient describes bilat upper arm pain for about 2 months, mostly during the night and in the morning. Usually is worse at night and she needs to get up and stretch her arms. After she takes 2 Tylenol in the morning, pain is very milder during the day. She noticed also occasional pain in her shoulders, neck. No paresthesia in arms and hands. No injuries. No pelvic, griddle, leg pain. Pain did not improve with stopping Crestor for a month.  She denies chest pain, dyspnea, but has some mild cough.  When I saw her last time on 12/8/23, we started prednisone with clinical diagnosis of PMR ( ESR was normal). After just 2-3 days on prednisone, all pain and aches resolved. She is currently on 5 mg daily and will follow the written weaning protocol.  She is doing really well now.  She sold her house and is moving to [place] tomorrow.   We made the plan for the next few months and she will establish care with MD in [place].  - Comprehensive metabolic panel; Future  - Comprehensive metabolic panel    Mixed hyperlipidemia  She will stay off Crestor for another month. She tried Zocor in the past and had myalgia.      Gastroesophageal reflux disease with esophagitis without hemorrhage  Stable, no heartburn.   - omeprazole (PRILOSEC) 20 MG DR capsule; Take 1 capsule (20 mg) by mouth daily    Acquired hypothyroidism  On levothyroxine 75 mcg.  Component      Latest Ref Rn 12/8/2023  9:48 AM   TSH      0.30 - 4.20 uIU/mL 4.67 (H)         - TSH with free T4 reflex; Future  - TSH with free T4 reflex    Hypercalcemia  Component      Latest Ref Rn 12/8/2023  9:48 AM   Sodium      135 - 145 mmol/L 141    Potassium      3.4 - 5.3 mmol/L 3.5    Carbon Dioxide (CO2)      22 - 29 mmol/L 28    Anion Gap      7 - 15 mmol/L 14    Urea Nitrogen      8.0 - 23.0 mg/dL 15.3    Creatinine      0.51 - 0.95 mg/dL 0.91    GFR Estimate      >60 mL/min/1.73m2 64    Calcium      8.8 - 10.2 mg/dL 10.5 (H)    Chloride      98 -  "107 mmol/L 99    Glucose      70 - 99 mg/dL 103 (H)    Alkaline Phosphatase      40 - 150 U/L 68    AST      0 - 45 U/L 29    ALT      0 - 50 U/L 32    Protein Total      6.4 - 8.3 g/dL 8.7 (H)    Albumin      3.5 - 5.2 g/dL 4.6    Bilirubin Total      <=1.2 mg/dL 0.5         - Comprehensive metabolic panel; Future  - Parathyroid Hormone Intact; Future  - Comprehensive metabolic panel  - Parathyroid Hormone Intact      Primary hypertension  Stable on Maxzide.She was concerned that maybe Maxzide is causing this pain and she stopped it for few days, but her SBP was up to 150-160.   We will continue Maxzide. She is taking it for years and it helps with some fluid overload too.     Osteopenia with high fracture risk  Comment: patient declined osteoporosis treatment.     BMI:   Estimated body mass index is 27.95 kg/m  as calculated from the following:    Height as of this encounter: 1.548 m (5' 0.95\").    Weight as of this encounter: 67 kg (147 lb 11.2 oz).       Patient Instructions   Dr Maral Orourke Internal Medicine doctor in [place].      After 2 weeks on 5 mg prednisone daily, you can take 2.5 mg daily for 2 weeks, and then stop.    One month after prednisone therapy is over, you can restart rosuvastatin and you can start with 1/2 pill daily.    Recheck cholesterol 3 months later.    Continue thyroid medication and blood pressure medication as prescribed.         Return in about 6 months (around 7/9/2024) for Follow up.     Rusty Mcgowan MD  [facility] [place]    Salome Buchanan is a 78 year old, presenting for the following health issues:  Follow Up (F/U)      1/9/2024     8:43 AM   Additional Questions   Roomed by Iram       History of Present Illness       Hyperlipidemia:  She presents for follow up of hyperlipidemia.   She is not taking medication to lower cholesterol. She is not having myalgia or other side effects to statin medications.    Reason for visit:  Follow " "Up    She eats 2-3 servings of fruits and vegetables daily.She consumes 0 sweetened beverage(s) daily.She exercises with enough effort to increase her heart rate 9 or less minutes per day.  She exercises with enough effort to increase her heart rate 3 or less days per week.   She is taking medications regularly.                 Review of Systems         Objective    /84   Pulse 86   Resp 16   Ht 1.548 m (5' 0.95\")   Wt 67 kg (147 lb 11.2 oz)   LMP  (LMP Unknown)   SpO2 98%   BMI 27.95 kg/m    Body mass index is 27.95 kg/m .  Physical Exam                         "

## 2024-01-09 NOTE — PATIENT INSTRUCTIONS
Dr Maral Orourke Internal Medicine doctor in [place].      After 2 weeks on 5 mg prednisone daily, you can take 2.5 mg daily for 2 weeks, and then stop.    One month after prednisone therapy is over, you can restart rosuvastatin and you can start with 1/2 pill daily.    Recheck cholesterol 3 months later.    Continue thyroid medication and blood pressure medication as prescribed.

## 2024-01-11 DIAGNOSIS — E03.9 ACQUIRED HYPOTHYROIDISM: ICD-10-CM

## 2024-01-11 RX ORDER — LEVOTHYROXINE SODIUM 88 UG/1
88 TABLET ORAL DAILY
Qty: 90 TABLET | Refills: 3 | Status: SHIPPED | OUTPATIENT
Start: 2024-01-11

## 2024-10-18 DIAGNOSIS — I10 ESSENTIAL HYPERTENSION: ICD-10-CM

## 2024-10-18 RX ORDER — TRIAMTERENE AND HYDROCHLOROTHIAZIDE 75; 50 MG/1; MG/1
TABLET ORAL
Qty: 90 TABLET | Refills: 3 | Status: SHIPPED | OUTPATIENT
Start: 2024-10-18